# Patient Record
Sex: FEMALE | Race: WHITE | Employment: FULL TIME | URBAN - METROPOLITAN AREA
[De-identification: names, ages, dates, MRNs, and addresses within clinical notes are randomized per-mention and may not be internally consistent; named-entity substitution may affect disease eponyms.]

---

## 2017-08-17 ENCOUNTER — HOSPITAL ENCOUNTER (OUTPATIENT)
Dept: VASCULAR SURGERY | Age: 62
Discharge: HOME | End: 2017-08-17
Attending: INTERNAL MEDICINE
Payer: COMMERCIAL

## 2017-08-17 ENCOUNTER — HOSPITAL ENCOUNTER (OUTPATIENT)
Dept: ULTRASOUND IMAGING | Age: 62
Discharge: HOME | End: 2017-08-17
Attending: INTERNAL MEDICINE
Payer: COMMERCIAL

## 2017-08-17 DIAGNOSIS — R60.9 EDEMA: ICD-10-CM

## 2017-08-17 DIAGNOSIS — M79.604 RIGHT LEG PAIN: ICD-10-CM

## 2017-08-17 PROCEDURE — 93971 EXTREMITY STUDY: CPT

## 2017-08-17 NOTE — PROCEDURES
[facility]  *** AMENDED REPORT ***    Name: Kenyetta Charles  MRN: [id number]    Outpatient  : 1955  HIS Order #: [id number]  [id number] [facility] Visit #: [id number]  Date: 17 Aug 2017    TYPE OF TEST: Peripheral Venous Testing    REASON FOR TEST  Pain in limb, Limb swelling    Right Leg:-  Deep venous thrombosis:           No  Superficial venous thrombosis:    Yes  Deep venous insufficiency:        Not examined  Superficial venous insufficiency: Not examined      AMENDED INTERPRETATION/FINDINGS  Duplex images were obtained using 2-D gray scale, color flow, and  spectral Doppler analysis. Right leg :  1. Deep vein(s) visualized include the common femoral, deep femoral,  proximal femoral, mid femoral, popliteal(above knee),  popliteal(fossa), popliteal(below knee) and posterior tibial veins at  distal calf. 2. No evidence of deep venous thrombosis detected in the veins  visualized. 3. No evidence of deep vein thrombosis in the contralateral common  femoral vein. 4. Superficial vein(s) visualized include the great saphenous vein. 5. Superficial venous thrombosis identified in the great saphenous in  the proximal thigh, great saphenous in the mid thigh, great saphenous  in the distal thigh and great saphenous in the calf. AMENDED COMMENTS  Verbal results given to Dr. Selam Esquivel    I have personally reviewed the data relevant to the interpretation of  this study.     TECHNOLOGIST: Latanya Burch  Signed: 2017 04:41 PM    PHYSICIAN: Eliecer Fox MD  Signed: 2017 01:47 PM

## 2017-08-18 ENCOUNTER — APPOINTMENT (OUTPATIENT)
Dept: CT IMAGING | Age: 62
DRG: 176 | End: 2017-08-18
Attending: PHYSICIAN ASSISTANT
Payer: COMMERCIAL

## 2017-08-18 ENCOUNTER — APPOINTMENT (OUTPATIENT)
Dept: GENERAL RADIOLOGY | Age: 62
DRG: 176 | End: 2017-08-18
Attending: INTERNAL MEDICINE
Payer: COMMERCIAL

## 2017-08-18 ENCOUNTER — HOSPITAL ENCOUNTER (INPATIENT)
Age: 62
LOS: 4 days | Discharge: HOME | DRG: 176 | End: 2017-08-23
Attending: INTERNAL MEDICINE | Admitting: INTERNAL MEDICINE
Payer: COMMERCIAL

## 2017-08-18 DIAGNOSIS — I26.92 ACUTE SADDLE PULMONARY EMBOLISM WITHOUT ACUTE COR PULMONALE (HCC): Primary | ICD-10-CM

## 2017-08-18 DIAGNOSIS — I82.811 SUPERFICIAL THROMBOSIS OF LEG, RIGHT: ICD-10-CM

## 2017-08-18 LAB
ALBUMIN SERPL-MCNC: 3.9 G/DL (ref 3.4–5)
ALBUMIN/GLOB SERPL: 0.9 {RATIO} (ref 0.8–1.7)
ALP SERPL-CCNC: 126 U/L (ref 45–117)
ALT SERPL-CCNC: 52 U/L (ref 13–56)
ANION GAP SERPL CALC-SCNC: 10 MMOL/L (ref 3–18)
APPEARANCE UR: CLEAR
APTT PPP: 26.4 SEC (ref 23–36.4)
AST SERPL-CCNC: 48 U/L (ref 15–37)
BASOPHILS # BLD: 0 K/UL (ref 0–0.06)
BASOPHILS NFR BLD: 0 % (ref 0–2)
BILIRUB SERPL-MCNC: 0.9 MG/DL (ref 0.2–1)
BILIRUB UR QL: NEGATIVE
BNP SERPL-MCNC: 295 PG/ML (ref 0–900)
BUN SERPL-MCNC: 25 MG/DL (ref 7–18)
BUN/CREAT SERPL: 17 (ref 12–20)
CALCIUM SERPL-MCNC: 9.4 MG/DL (ref 8.5–10.1)
CHLORIDE SERPL-SCNC: 105 MMOL/L (ref 100–108)
CK MB CFR SERPL CALC: NORMAL % (ref 0–4)
CK MB SERPL-MCNC: <1 NG/ML (ref 5–25)
CK SERPL-CCNC: 32 U/L (ref 26–192)
CO2 SERPL-SCNC: 28 MMOL/L (ref 21–32)
COLOR UR: YELLOW
CREAT SERPL-MCNC: 1.48 MG/DL (ref 0.6–1.3)
DIFFERENTIAL METHOD BLD: NORMAL
EOSINOPHIL # BLD: 0.3 K/UL (ref 0–0.4)
EOSINOPHIL NFR BLD: 3 % (ref 0–5)
ERYTHROCYTE [DISTWIDTH] IN BLOOD BY AUTOMATED COUNT: 13.6 % (ref 11.6–14.5)
GLOBULIN SER CALC-MCNC: 4.3 G/DL (ref 2–4)
GLUCOSE SERPL-MCNC: 106 MG/DL (ref 74–99)
GLUCOSE UR STRIP.AUTO-MCNC: NEGATIVE MG/DL
HCT VFR BLD AUTO: 42.8 % (ref 35–45)
HGB BLD-MCNC: 14.2 G/DL (ref 12–16)
HGB UR QL STRIP: NEGATIVE
INR PPP: 1 (ref 0.8–1.2)
KETONES UR QL STRIP.AUTO: NEGATIVE MG/DL
LEUKOCYTE ESTERASE UR QL STRIP.AUTO: NEGATIVE
LYMPHOCYTES # BLD: 2.7 K/UL (ref 0.9–3.6)
LYMPHOCYTES NFR BLD: 24 % (ref 21–52)
MAGNESIUM SERPL-MCNC: 1.6 MG/DL (ref 1.6–2.6)
MCH RBC QN AUTO: 31 PG (ref 24–34)
MCHC RBC AUTO-ENTMCNC: 33.2 G/DL (ref 31–37)
MCV RBC AUTO: 93.4 FL (ref 74–97)
MONOCYTES # BLD: 0.7 K/UL (ref 0.05–1.2)
MONOCYTES NFR BLD: 6 % (ref 3–10)
NEUTS SEG # BLD: 7.5 K/UL (ref 1.8–8)
NEUTS SEG NFR BLD: 67 % (ref 40–73)
NITRITE UR QL STRIP.AUTO: NEGATIVE
PH UR STRIP: 6.5 [PH] (ref 5–8)
PLATELET # BLD AUTO: 193 K/UL (ref 135–420)
PMV BLD AUTO: 10.8 FL (ref 9.2–11.8)
POTASSIUM SERPL-SCNC: 4.6 MMOL/L (ref 3.5–5.5)
PROT SERPL-MCNC: 8.2 G/DL (ref 6.4–8.2)
PROT UR STRIP-MCNC: NEGATIVE MG/DL
PROTHROMBIN TIME: 13 SEC (ref 11.5–15.2)
RBC # BLD AUTO: 4.58 M/UL (ref 4.2–5.3)
SODIUM SERPL-SCNC: 143 MMOL/L (ref 136–145)
SP GR UR REFRACTOMETRY: 1.01 (ref 1–1.03)
TROPONIN I SERPL-MCNC: <0.02 NG/ML (ref 0–0.06)
UROBILINOGEN UR QL STRIP.AUTO: 0.2 EU/DL (ref 0.2–1)
WBC # BLD AUTO: 11.1 K/UL (ref 4.6–13.2)

## 2017-08-18 PROCEDURE — 99285 EMERGENCY DEPT VISIT HI MDM: CPT

## 2017-08-18 PROCEDURE — 81003 URINALYSIS AUTO W/O SCOPE: CPT | Performed by: PHYSICIAN ASSISTANT

## 2017-08-18 PROCEDURE — 83880 ASSAY OF NATRIURETIC PEPTIDE: CPT | Performed by: PHYSICIAN ASSISTANT

## 2017-08-18 PROCEDURE — 80053 COMPREHEN METABOLIC PANEL: CPT | Performed by: INTERNAL MEDICINE

## 2017-08-18 PROCEDURE — 82550 ASSAY OF CK (CPK): CPT | Performed by: INTERNAL MEDICINE

## 2017-08-18 PROCEDURE — 85730 THROMBOPLASTIN TIME PARTIAL: CPT | Performed by: INTERNAL MEDICINE

## 2017-08-18 PROCEDURE — 74011250636 HC RX REV CODE- 250/636: Performed by: PHYSICIAN ASSISTANT

## 2017-08-18 PROCEDURE — 85610 PROTHROMBIN TIME: CPT | Performed by: INTERNAL MEDICINE

## 2017-08-18 PROCEDURE — 77030013140 HC MSK NEB VYRM -A

## 2017-08-18 PROCEDURE — 74011250636 HC RX REV CODE- 250/636

## 2017-08-18 PROCEDURE — 74011000250 HC RX REV CODE- 250: Performed by: PHYSICIAN ASSISTANT

## 2017-08-18 PROCEDURE — 71020 XR CHEST PA LAT: CPT

## 2017-08-18 PROCEDURE — 71275 CT ANGIOGRAPHY CHEST: CPT

## 2017-08-18 PROCEDURE — 96375 TX/PRO/DX INJ NEW DRUG ADDON: CPT

## 2017-08-18 PROCEDURE — 85025 COMPLETE CBC W/AUTO DIFF WBC: CPT | Performed by: INTERNAL MEDICINE

## 2017-08-18 PROCEDURE — 83735 ASSAY OF MAGNESIUM: CPT | Performed by: PHYSICIAN ASSISTANT

## 2017-08-18 PROCEDURE — 96361 HYDRATE IV INFUSION ADD-ON: CPT

## 2017-08-18 PROCEDURE — 96374 THER/PROPH/DIAG INJ IV PUSH: CPT

## 2017-08-18 PROCEDURE — 93005 ELECTROCARDIOGRAM TRACING: CPT

## 2017-08-18 PROCEDURE — 74011636320 HC RX REV CODE- 636/320: Performed by: INTERNAL MEDICINE

## 2017-08-18 RX ORDER — IPRATROPIUM BROMIDE AND ALBUTEROL SULFATE 2.5; .5 MG/3ML; MG/3ML
3 SOLUTION RESPIRATORY (INHALATION)
Status: COMPLETED | OUTPATIENT
Start: 2017-08-18 | End: 2017-08-18

## 2017-08-18 RX ORDER — ONDANSETRON 2 MG/ML
4 INJECTION INTRAMUSCULAR; INTRAVENOUS
Status: COMPLETED | OUTPATIENT
Start: 2017-08-18 | End: 2017-08-18

## 2017-08-18 RX ORDER — FUROSEMIDE 10 MG/ML
40 INJECTION INTRAMUSCULAR; INTRAVENOUS
Status: COMPLETED | OUTPATIENT
Start: 2017-08-18 | End: 2017-08-18

## 2017-08-18 RX ORDER — SULFAMETHOXAZOLE AND TRIMETHOPRIM 800; 160 MG/1; MG/1
1 TABLET ORAL 2 TIMES DAILY
COMMUNITY
End: 2017-08-23

## 2017-08-18 RX ORDER — FUROSEMIDE 10 MG/ML
80 INJECTION INTRAMUSCULAR; INTRAVENOUS
Status: DISCONTINUED | OUTPATIENT
Start: 2017-08-18 | End: 2017-08-18 | Stop reason: SDUPTHER

## 2017-08-18 RX ORDER — ONDANSETRON 2 MG/ML
INJECTION INTRAMUSCULAR; INTRAVENOUS
Status: COMPLETED
Start: 2017-08-18 | End: 2017-08-18

## 2017-08-18 RX ADMIN — IOPAMIDOL 75 ML: 755 INJECTION, SOLUTION INTRAVENOUS at 23:32

## 2017-08-18 RX ADMIN — FUROSEMIDE 40 MG: 10 INJECTION, SOLUTION INTRAMUSCULAR; INTRAVENOUS at 19:45

## 2017-08-18 RX ADMIN — ONDANSETRON 4 MG: 2 INJECTION INTRAMUSCULAR; INTRAVENOUS at 21:25

## 2017-08-18 RX ADMIN — SODIUM CHLORIDE 1000 ML: 900 INJECTION, SOLUTION INTRAVENOUS at 21:25

## 2017-08-18 RX ADMIN — IPRATROPIUM BROMIDE AND ALBUTEROL SULFATE 3 ML: .5; 3 SOLUTION RESPIRATORY (INHALATION) at 20:02

## 2017-08-18 NOTE — ED NOTES
Report received from [address] assumed care of pt. Pt resting comfortably in stretcher with family at bedside. Updated pt on plan of care awaiting test results. Pt verbalized understanding, warm blankets provided to pt for comfort, bed in lowest lock position, side rails up x2, call bell in reach of pt and pt instructed to use call bell for assistance.

## 2017-08-18 NOTE — Clinical Note
Status[de-identified] Inpatient [101] Type of Bed: Telemetry [19] Inpatient Hospitalization Certified Necessary for the Following Reasons: 3. Patient receiving treatment that can only be provided in an inpatient setting (further clarification in H&P documentation) Admitting Diagnosis: Pulmonary emboli ([address] [891392] Admitting Physician: Melissa Mcconnell Attending Physician: Melissa Mcconnell Estimated Length of Stay: 2 Midnights Discharge Plan[de-identified] Home with Office Follow-up

## 2017-08-18 NOTE — IP AVS SNAPSHOT
Milton Doss 
 
 
 [address] [place] [id number] 
[phone] Patient: Halie Hwang 
MRN: [id number]:[id number] You are allergic to the following No active allergies Recent Documentation Height Weight BMI OB Status Smoking Status 1.676 m 136.1 kg 48.43 kg/m2 Hysterectomy Never Smoker Unresulted Labs Order Current Status LUPUS ANTICOAGULANT PANEL W/ REFLEX In process Emergency Contacts Name Discharge Info Relation Home Work Mobile [address] CAREGIVER [3] Spouse [3] [phone] About your hospitalization You were admitted on:  August 19, 2017 You last received care in the:  [address] [place] [address] You were discharged on:  August 23, 2017 Unit phone number:  [phone] Why you were hospitalized Your primary diagnosis was:  Acute [place] Pulmonary Embolism Without Acute Cor Pulmonale (Hcc) Your diagnoses also included:  Pulmonary Emboli (Hcc), Essential Hypertension, Benign, Acquired Hypothyroidism, Superficial Thrombophlebitis Providers Seen During Your Hospitalizations Provider Role Specialty Primary office phone Tenisha Jordan MD Attending Provider Emergency Medicine [phone] Jenniffer Jimenez DO Attending Provider Internal Medicine [phone] Your Primary Care Physician (PCP) Primary Care Physician Office Phone Office Fax Marilynfredy Carreon [phone] Follow-up Information Follow up With Details Comments Contact Info Dr[address]  In 1 month Dr. Jason bashir  In 2 weeks Malu Medina MD   [address] [place] [address] [address] [place] [address] 
[phone] Current Discharge Medication List  
  
START taking these medications Dose & Instructions Dispensing Information Comments Morning Noon Evening Bedtime rivaroxaban 15 mg (42)- 20 mg (9) Dspk Commonly known as:  Kayy Contreras Your last dose was: Your next dose is: Take one 15 mg tablet twice a day with food for the first 21 days. Then, take one 20 mg tablet once a day with food for 9 days. Quantity:  1 Dose Pack Refills:  0 CONTINUE these medications which have NOT CHANGED Dose & Instructions Dispensing Information Comments Morning Noon Evening Bedtime  
 furosemide 20 mg tablet Commonly known as:  LASIX Your last dose was: Your next dose is:    
   
   
 Dose:  20 mg Take 20 mg by mouth daily. Refills:  0  
     
   
   
   
  
 levothyroxine 200 mcg tablet Commonly known as:  SYNTHROID Your last dose was: Your next dose is:    
   
   
 Dose:  200 mcg Take 200 mcg by mouth Daily (before breakfast). Refills:  0  
     
   
   
   
  
 lisinopril 40 mg tablet Commonly known as:  Velton Hampton Your last dose was: Your next dose is:    
   
   
 Dose:  40 mg Take 40 mg by mouth daily. Refills:  0 STOP taking these medications BACTRIM -800 mg per tablet Generic drug:  trimethoprim-sulfamethoxazole  
   
  
 ibuprofen 200 mg tablet Commonly known as:  MOTRIN Where to Get Your Medications Information on where to get these meds will be given to you by the nurse or doctor. ! Ask your nurse or doctor about these medications  
  rivaroxaban 15 mg (42)- 20 mg (9) Dspk Discharge Instructions DISCHARGE SUMMARY from Nurse The following personal items are in your possession at time of discharge: 
 
Dental Appliances: Uppers Visual Aid: Glasses, With patient Home Medications: None Jewelry: Ring Clothing: Sweater Other Valuables: Cell Phone PATIENT INSTRUCTIONS: 
 
 
F-face looks uneven A-arms unable to move or move unevenly S-speech slurred or non-existent T-time-call 911 as soon as signs and symptoms begin-DO NOT go Back to bed or wait to see if you get better-TIME IS BRAIN. Warning Signs of HEART ATTACK Call 911 if you have these symptoms: 
? Chest discomfort. Most heart attacks involve discomfort in the center of the chest that lasts more than a few minutes, or that goes away and comes back. It can feel like uncomfortable pressure, squeezing, fullness, or pain. ? Discomfort in other areas of the upper body. Symptoms can include pain or discomfort in one or both arms, the back, neck, jaw, or stomach. ? Shortness of breath with or without chest discomfort. ? Other signs may include breaking out in a cold sweat, nausea, or lightheadedness. Don't wait more than five minutes to call [address]! Fast action can save your life. Calling 911 is almost always the fastest way to get lifesaving treatment. Emergency Medical Services staff can begin treatment when they arrive  up to an hour sooner than if someone gets to the hospital by car. The discharge information has been reviewed with the patient and spouse. The patient and spouse verbalized understanding. Discharge medications reviewed with the patient and spouse and appropriate educational materials and side effects teaching were provided. Patient armband removed and shredded Discharge Instructions Attachments/References HYPERTENSION (ENGLISH) Discharge Orders None [facility] Announcement We are excited to announce that we are making your provider's discharge notes available to you in Springestt. You will see these notes when they are completed and signed by the physician that discharged you from your recent hospital stay.   If you have any questions or concerns about any information you see in Banter!, please call the Health Information Department where you were seen or reach out to your Primary Care Provider for more information about your plan of care. Introducing [facility]! Lyudmila Lou introduces Banter! patient portal. Now you can access parts of your medical record, email your doctor's office, and request medication refills online. 1. In your internet browser, go to [email or web address] 2. Click on the First Time User? Click Here link in the Sign In box. You will see the New Member Sign Up page. 3. Enter your Banter! Access Code exactly as it appears below. You will not need to use this code after youve completed the sign-up process. If you do not sign up before the expiration date, you must request a new code. · Banter! Access Code: [email or web address] Expires: 11/15/2017  2:09 PM 
 
4. Enter the last four digits of your Social Security Number (xxxx) and Date of Birth (mm/dd/yyyy) as indicated and click Submit. You will be taken to the next sign-up page. 5. Create a Banter! ID. This will be your Banter! login ID and cannot be changed, so think of one that is secure and easy to remember. 6. Create a Banter! password. You can change your password at any time. 7. Enter your Password Reset Question and Answer. This can be used at a later time if you forget your password. 8. Enter your e-mail address. You will receive e-mail notification when new information is available in [address] 9. Click Sign Up. You can now view and download portions of your medical record. 10. Click the Download Summary menu link to download a portable copy of your medical information. If you have questions, please visit the Frequently Asked Questions section of the Banter! website. Remember, Banter! is NOT to be used for urgent needs. For medical emergencies, dial 911. Now available from your iPhone and Android! General Information Please provide this summary of care documentation to your next provider. Patient Signature:  ____________________________________________________________ Date:  ____________________________________________________________  
  
[facility] Provider Signature:  ____________________________________________________________ Date:  ____________________________________________________________ More Information High Blood Pressure: Care Instructions Your Care Instructions If your blood pressure is usually above 140/90, you have high blood pressure, or hypertension. That means the top number is 140 or higher or the bottom number is 90 or higher, or both. Despite what a lot of people think, high blood pressure usually doesn't cause headaches or make you feel dizzy or lightheaded. It usually has no symptoms. But it does increase your risk for heart attack, stroke, and kidney or eye damage. The higher your blood pressure, the more your risk increases. Your doctor will give you a goal for your blood pressure. Your goal will be based on your health and your age. An example of a goal is to keep your blood pressure below 140/90. Lifestyle changes, such as eating healthy and being active, are always important to help lower blood pressure. You might also take medicine to reach your blood pressure goal. 
Follow-up care is a key part of your treatment and safety. Be sure to make and go to all appointments, and call your doctor if you are having problems. It's also a good idea to know your test results and keep a list of the medicines you take. How can you care for yourself at home? Medical treatment · If you stop taking your medicine, your blood pressure will go back up. You may take one or more types of medicine to lower your blood pressure. Be safe with medicines. Take your medicine exactly as prescribed. Call your doctor if you think you are having a problem with your medicine. · Talk to your doctor before you start taking aspirin every day. Aspirin can help certain people lower their risk of a heart attack or stroke. But taking aspirin isn't right for everyone, because it can cause serious bleeding. · See your doctor regularly. You may need to see the doctor more often at first or until your blood pressure comes down. · If you are taking blood pressure medicine, talk to your doctor before you take decongestants or anti-inflammatory medicine, such as ibuprofen. Some of these medicines can raise blood pressure. · Learn how to check your blood pressure at home. Lifestyle changes · Stay at a healthy weight. This is especially important if you put on weight around the waist. Losing even 10 pounds can help you lower your blood pressure. · If your doctor recommends it, get more exercise. Walking is a good choice. Bit by bit, increase the amount you walk every day. Try for at least 30 minutes on most days of the week. You also may want to swim, bike, or do other activities. · Avoid or limit alcohol. Talk to your doctor about whether you can drink any alcohol. · Try to limit how much sodium you eat to less than 2,300 milligrams (mg) a day. Your doctor may ask you to try to eat less than 1,500 mg a day. · Eat plenty of fruits (such as bananas and oranges), vegetables, legumes, whole grains, and low-fat dairy products. · Lower the amount of saturated fat in your diet. Saturated fat is found in animal products such as milk, cheese, and meat. Limiting these foods may help you lose weight and also lower your risk for heart disease. · Do not smoke. Smoking increases your risk for heart attack and stroke. If you need help quitting, talk to your doctor about stop-smoking programs and medicines. These can increase your chances of quitting for good. When should you call for help? Call 911 anytime you think you may need emergency care.  This may mean having symptoms that suggest that your blood pressure is causing a serious heart or blood vessel problem. Your blood pressure may be over 180/110. For example, call 911 if: 
· You have symptoms of a heart attack. These may include: ¨ Chest pain or pressure, or a strange feeling in the chest. 
¨ Sweating. ¨ Shortness of breath. ¨ Nausea or vomiting. ¨ Pain, pressure, or a strange feeling in the back, neck, jaw, or upper belly or in one or both shoulders or arms. ¨ Lightheadedness or sudden weakness. ¨ A fast or irregular heartbeat. · You have symptoms of a stroke. These may include: 
¨ Sudden numbness, tingling, weakness, or loss of movement in your face, arm, or leg, especially on only one side of your body. ¨ Sudden vision changes. ¨ Sudden trouble speaking. ¨ Sudden confusion or trouble understanding simple statements. ¨ Sudden problems with walking or balance. ¨ A sudden, severe headache that is different from past headaches. · You have severe back or belly pain. Do not wait until your blood pressure comes down on its own. Get help right away. Call your doctor now or seek immediate care if: 
· Your blood pressure is much higher than normal (such as 180/110 or higher), but you don't have symptoms. · You think high blood pressure is causing symptoms, such as: ¨ Severe headache. ¨ Blurry vision. Watch closely for changes in your health, and be sure to contact your doctor if: 
· Your blood pressure measures 140/90 or higher at least 2 times. That means the top number is 140 or higher or the bottom number is 90 or higher, or both. · You think you may be having side effects from your blood pressure medicine. · Your blood pressure is usually normal, but it goes above normal at least 2 times. Where can you learn more? Go to http://rj-raquel.info/. Enter C920 in the search box to learn more about \"High Blood Pressure: Care Instructions. \" Current as of: August 8, 2016 Content Version: 11.3 © 4291-3686 [employer]. Care instructions adapted under license by [employer] (which disclaims liability or warranty for this information). If you have questions about a medical condition or this instruction, always ask your healthcare professional. [address] any warranty or liability for your use of this information.

## 2017-08-18 NOTE — ED NOTES
Pt report given to Colette Gudino RN. SBAR, ED summary, MAR and recent results reviewed. Pt resting in stretcher with side rails raised and call light within reach. Pt in NAD at this time.

## 2017-08-18 NOTE — ED TRIAGE NOTES
Pt states \"this morning I started with having shortness of breath worse when I walk around. I break out into a sweat. \" States had a vascular study completed on RLE here yesterday to r/o blood clot as pt has been having redness, pain and swelling to RLE being tx with various abx by PCP. Unknown results. Pt states now the leg feels better. Denies CP, dizziness, nausea. Sepsis Screening completed    (  )Patient meets SIRS criteria. ( x )Patient does not meet SIRS criteria.       SIRS Criteria is achieved when two or more of the following are present   Temperature < 96.8°F (36°C) or > 100.9°F (38.3°C)   Heart Rate > 90 beats per minute   Respiratory Rate > 20 breaths per minute   WBC count > 12,000 or <4,000 or > 10% bands

## 2017-08-18 NOTE — ED PROVIDER NOTES
HPI Comments: 6:54 PM    Liya Falcon is a 58 y.o. female with PMHx of HTN, hypothyroidism, arthritis, and morbid obesity presenting to the ED c/o SOB, onset this AM. Pt states that sxs are worsened by ambulating. Associated symptoms include chest tightness and minimal non-productive cough. Pt also notes RLE redness, pain, and edema (unchanged from baseline). She was evaluated two weeks ago by Bay Bone MD (PCP) for similar sxs and was rx'd Cipro and prednisone with no relief. Pt saw his partner Cintia Lu DO (PCP) yesterday for right leg pain and edema and was rx'd Bactrim. Pain to R thigh has improved. Pt has outpatient US performed today which revealed superficial thrombosis but no DVT. No hx of DVT/PE. Pt note having a cardiac stress test about 15 years ago. Daily medications include Lasix 20 mg, Ibuprofen 200 mg, Levothyroxine 200 mcg, and Lisinopril 40 mg. Pt denies any history of PE, CHF, CAD, MI, COPD, or asthma. Pt specifically denies any CP, congestion, fever, and rhinorrhea. PCP: Scarlett Mcrae MD    There are no other complaints, changes, or physical findings at this time. US duplex venous 08/18/2017  Duplex images were obtained using 2-D gray scale, color flow, and  spectral Doppler analysis. Right leg :  1. Deep vein(s) visualized include the common femoral, deep femoral,  proximal femoral, mid femoral, popliteal(above knee),  popliteal(fossa), popliteal(below knee) and posterior tibial veins at  distal calf. 2. No evidence of deep venous thrombosis detected in the veins  visualized. 3. No evidence of deep vein thrombosis in the contralateral common  femoral vein. 4. Superficial vein(s) visualized include the great saphenous vein. 5. Superficial venous thrombosis identified in the great saphenous in  the proximal thigh, great saphenous in the mid thigh, great saphenous  in the distal thigh and great saphenous in the calf.       Patient is a 58 y.o. female presenting with shortness of breath. The history is provided by the patient. No  was used. Shortness of Breath   Associated symptoms include cough (dry) and leg swelling. Pertinent negatives include no fever, no chest pain and no vomiting. Past Medical History:   Diagnosis Date    Arthritis     Cancer ([facility] [address]     kidney right    Hypertension     Morbid obesity ([facility] [address]     Thyroid disease     hypothyroidism       Past Surgical History:   Procedure Laterality Date    HX GI      jeannie    HX HYSTERECTOMY      HX OOPHORECTOMY      HX ORTHOPAEDIC      left ankle fx repair    HX UROLOGICAL      right kidney removed         History reviewed. No pertinent family history. Social History     Social History    Marital status:      Spouse name: N/A    Number of children: N/A    Years of education: N/A     Occupational History    Not on file. Social History Main Topics    Smoking status: Never Smoker    Smokeless tobacco: Never Used    Alcohol use No    Drug use: No    Sexual activity: Not on file     Other Topics Concern    Not on file     Social History Narrative         ALLERGIES: Review of patient's allergies indicates no known allergies. Review of Systems   Constitutional: Negative for chills and fever. Respiratory: Positive for cough (dry), chest tightness and shortness of breath. Cardiovascular: Positive for leg swelling. Negative for chest pain. Gastrointestinal: Negative for nausea and vomiting. Musculoskeletal: Positive for myalgias (RLE leg pain). Skin: Positive for color change (RLE redness). Neurological: Negative for dizziness, weakness and numbness. Hematological: Does not bruise/bleed easily.        Vitals:    08/19/17 0700 08/19/17 0802 08/19/17 0824 08/19/17 0900   BP: 98/51 (!) 86/48  90/52   Pulse: 80 93  84   Resp: 17 23 17   Temp:  98.8 °F (37.1 °C) 98.8 °F (37.1 °C)    SpO2: 94% 96%  94%   Weight:       Height:                Physical Exam Constitutional: She is oriented to person, place, and time. She appears well-developed and well-nourished. No distress.  female in NAD. Alert. No resp distress. Speaking in complete sentences     HENT:   Head: Normocephalic and atraumatic. Right Ear: External ear normal.   Left Ear: External ear normal.   Mouth/Throat: Uvula is midline, oropharynx is clear and moist and mucous membranes are normal.   Eyes: Conjunctivae are normal.   Neck: Normal range of motion. Cardiovascular: Regular rhythm, normal heart sounds and intact distal pulses. Tachycardia present. Pulses:       Dorsalis pedis pulses are 2+ on the right side, and 2+ on the left side. Posterior tibial pulses are 2+ on the right side, and 2+ on the left side. Tachy at 120   Pulmonary/Chest: Effort normal and breath sounds normal. No respiratory distress. She has no wheezes. She has no rales. Musculoskeletal: Normal range of motion. Right upper leg: She exhibits swelling. She exhibits no tenderness. Right lower leg: She exhibits tenderness and swelling. Legs:       Right foot: There is normal range of motion, no tenderness, no bony tenderness and no swelling. Neurological: She is alert and oriented to person, place, and time. Skin: She is not diaphoretic. Psychiatric: She has a normal mood and affect. Judgment normal.   Nursing note and vitals reviewed.        RESULTS:    CARDIAC MONITOR NOTE:  Cardiac Rhythm: sinus tachycardia  Rate: 101 bpm     PULSE OXIMETRY NOTE:  Pulse-ox is 100% on RA  Interpretation: normal       EKG interpretation: (Preliminary)  6:14 PM   113 bpm, sinus tachycardia, possible left atrial enlargement, left ventricular hypertrophy, no STEMI  EKG read by Tawanna Gallo MD at 6:24 PM    CTA CHEST W OR W WO CONT   Final Result   Bilateral pulmonary embolism with a thin saddle component.     Splenomegaly.     7 mm right upper lobe pulmonary nodule which appears to contain fat and possibly  an adenoma. Consider a 6 month follow-up.     Findings called to emergency department prior to signing report. As read by the radiologist.      XR CHEST PA LAT    (Results Pending)      7:38 PM  RADIOLOGY FINDINGS  Chest X-ray shows no acute process  Pending review by Radiologist  Recorded by Luke Mayo, ED Scribe, as dictated by Hood Rubio PA-C      Labs Reviewed   METABOLIC PANEL, COMPREHENSIVE - Abnormal; Notable for the following:        Result Value    Glucose 106 (*)     BUN 25 (*)     Creatinine 1.48 (*)     GFR est AA 43 (*)     GFR est non-AA 36 (*)     AST (SGOT) 48 (*)     Alk. phosphatase 126 (*)     Globulin 4.3 (*)     All other components within normal limits   PTT - Abnormal; Notable for the following:     aPTT 113.1 (*)     All other components within normal limits   CBC WITH AUTOMATED DIFF - Abnormal; Notable for the following:     RBC 3.60 (*)     HGB 11.1 (*)     HCT 33.8 (*)     All other components within normal limits   MAGNESIUM - Abnormal; Notable for the following:     Magnesium 1.4 (*)     All other components within normal limits   CBC WITH AUTOMATED DIFF   CARDIAC PANEL,(CK, CKMB & TROPONIN)   NT-PRO BNP   MAGNESIUM   PROTHROMBIN TIME + INR   PTT   URINALYSIS W/ RFLX MICROSCOPIC   PHOSPHORUS   PTT   PTT       Recent Results (from the past 12 hour(s))   PTT    Collection Time: 08/19/17  7:05 AM   Result Value Ref Range    aPTT 113.1 (H) 23.0 - 36.4 SEC   CBC WITH AUTOMATED DIFF    Collection Time: 08/19/17  7:05 AM   Result Value Ref Range    WBC 9.5 4.6 - 13.2 K/uL    RBC 3.60 (L) 4.20 - 5.30 M/uL    HGB 11.1 (L) 12.0 - 16.0 g/dL    HCT 33.8 (L) 35.0 - 45.0 %    MCV 93.9 74.0 - 97.0 FL    MCH 30.8 24.0 - 34.0 PG    MCHC 32.8 31.0 - 37.0 g/dL    RDW 13.9 11.6 - 14.5 %    PLATELET 358 452 - 548 K/uL    MPV 10.1 9.2 - 11.8 FL    NEUTROPHILS 59 40 - 73 %    LYMPHOCYTES 30 21 - 52 %    MONOCYTES 8 3 - 10 %    EOSINOPHILS 3 0 - 5 %    BASOPHILS 0 0 - 2 %    ABS.  NEUTROPHILS 5.6 1.8 - 8.0 K/UL    ABS. LYMPHOCYTES 2.9 0.9 - 3.6 K/UL    ABS. MONOCYTES 0.8 0.05 - 1.2 K/UL    ABS. EOSINOPHILS 0.3 0.0 - 0.4 K/UL    ABS. BASOPHILS 0.0 0.0 - 0.06 K/UL    DF AUTOMATED     MAGNESIUM    Collection Time: 08/19/17  7:05 AM   Result Value Ref Range    Magnesium 1.4 (L) 1.6 - 2.6 mg/dL   PHOSPHORUS    Collection Time: 08/19/17  7:05 AM   Result Value Ref Range    Phosphorus 3.7 2.5 - 4.9 MG/DL       MDM  Number of Diagnoses or Management Options  Diagnosis management comments: ACS/MI, PE, arrhythmia, COPD, CHF, asthma, PNA, PTX.  Doubt dissection       Amount and/or Complexity of Data Reviewed  Clinical lab tests: ordered and reviewed (Prothrombin, PTT, UA, CMC, CMP, cardiac panel, NT-pro BNP, magnesium)  Tests in the radiology section of CPT®: ordered and reviewed (CXR)  Tests in the medicine section of CPT®: ordered and reviewed (EKG)  Discuss the patient with other providers: yes Asia Sanchez DO (Internal Medicine))  Independent visualization of images, tracings, or specimens: yes (CXR)    Critical Care  Total time providing critical care: 30-74 minutes (60 minutes)    ED Course     MEDICATIONS GIVEN:  Medications   heparin 25,000 units in D5W 250 ml infusion (14 Units/kg/hr × 136.1 kg IntraVENous Rate Change 8/19/17 0757)   acetaminophen (TYLENOL) tablet 650 mg (not administered)   morphine injection 2 mg (2 mg IntraVENous Given 8/19/17 0350)   0.9% sodium chloride infusion (100 mL/hr IntraVENous Continued On Admission 8/19/17 0300)   albuterol-ipratropium (DUO-NEB) 2.5 MG-0.5 MG/3 ML (3 mL Nebulization Given 8/18/17 2002)   furosemide (LASIX) injection 40 mg (40 mg IntraVENous Given 8/18/17 1945)   sodium chloride 0.9 % bolus infusion 1,000 mL (0 mL IntraVENous IV Completed 8/19/17 0220)   ondansetron (ZOFRAN) injection 4 mg (4 mg IntraVENous Given 8/18/17 2125)   iopamidol (ISOVUE-370) 76 % injection 75 mL (75 mL IntraVENous Given 8/18/17 9530)   heparin (porcine) 1,000 unit/mL injection 10,000 Units (10,000 Units IntraVENous Given 8/19/17 0101)       Procedures      PROGRESS NOTE:  6:54 PM  Initial assessment performed. Written by KAITLYN Wagner, as dictated by Raquel Franklin PA-C    BEDSIDE TRANSFER OF CARE:  8:00 PM  Patient care will be transferred from Raquel Franklin PA-C to Miko Martinez PA-C. Discussed available diagnostic results and care plan at length at beside. Patient and family made aware of provider change. All questions answered. Patient and family voiced understanding. To follow up on CTA. Written by Cosette Lombard, ED Scribe, as dictated by Raquel Franklin PA-C.    CONSULT NOTE:   9:14 PM  Miko Martinez PA-C spoke with April Arellano DO   Specialty: Internal Medicine   Discussed pt's hx, disposition, and available diagnostic and imaging results over the telephone. Reviewed care plans. Consulting physician agrees with plans as outlined. Agrees with giving 500 mL IV fluids before and after CTA Chest.   Written by Cosette Lombard, ED Scribe, as dictated by Miko Martinez PA-C.     CONSULT NOTE:   12:39 AM  Miko Martinez PA-C spoke with April Arellano DO   Specialty: Internal Medicine  Discussed pt's hx, disposition, and available diagnostic and imaging results over the telephone. Reviewed care plans. Consulting physician agrees with plans as outlined. Agrees to admit to Telemetry. Recommends giving Heparin. Written by Cosette Lombard, ED Scribe, as dictated by Miko Martinez PA-C.    ADMISSION NOTE:  12:39 AM  Patient is being admitted to the hospital by April Arellano DO. The results of their tests and reasons for their admission have been discussed with them and/or available family. They convey agreement and understanding for the need to be admitted and for their admission diagnosis. Written by Cosette Lombard, ED Scribe, as dictated by Miko Martinez PA-C.    CONDITIONS ON ADMISSION:  Deep Vein Thrombosis is not present at the time of admission.  Thrombosis is present at the time of admission. Pneumonia is not present at the time of admission. MRSA is not present at the time of admission. Wound infection is not present at the time of admission. Pressure Ulcer is not present at the time of admission. CLINICAL IMPRESSION    1. Acute saddle pulmonary embolism without acute cor pulmonale (HCC)    2. Superficial thrombosis of leg, right        PLAN: ADMIT TO TELEMETRY     12:46 AM  I have spent 60 minutes of critical care time involved in lab review, consultations with specialist, family decision-making, and documentation. During this entire length of time I was immediately available to the patient. Critical Care: The reason for providing this level of medical care for this critically ill patient was due a critical illness that impaired one or more vital organ systems such that there was a high probability of imminent or life threatening deterioration in the patients condition. This care involved high complexity decision making to assess, manipulate, and support vital system functions, to treat this degreee vital organ system failure and to prevent further life threatening deterioration of the patients condition. SCRIBE ATTESTATION:  This note is prepared by Adore Boswell, acting as Scribe for Tosin Castellano PA-C    This note is prepared by [employer], acting as Scribe for Lorane Gottron, PA-C    PROVIDER ATTESTATION:  Tosin Castellano PA-C: The scribe's documentation has been prepared under my direction and personally reviewed by me in its entirety. I confirm that the note above accurately reflects all work, treatment, procedures, and medical decision making performed by me. Lorane Gottron, PA-C: The scribe's documentation has been prepared under my direction and personally reviewed by me in its entirety. I confirm that the note above accurately reflects all work, treatment, procedures, and medical decision making performed by me.

## 2017-08-18 NOTE — IP AVS SNAPSHOT
Summary of Care Report The Summary of Care report has been created to help improve care coordination. Users with access to [employer] or [address] (Web-based application) may access additional patient information including the Discharge Summary. If you are not currently a [address] user and need more information, please call the number listed below in the [place] [address] section and ask to be connected with Medical Records. Facility Information Name Address Phone [address] [address] [phone] Patient Information Patient Name Sex Essie Johnson ([id number]) Female 1955 Discharge Information Admitting Provider Service Area Unit Celso Reyes DO / [address] [place] Card/Med / [phone] Discharge Provider Discharge Date/Time Discharge Disposition Destination (none) 8/23/2017 (Pending) AHR (none) Patient Language Language ENGLISH [13] Hospital Problems as of 8/23/2017  Reviewed: 8/19/2017 12:57 AM by Celso Reyes,  Class Noted - Resolved Last Modified POA Active Problems * (Principal)Acute saddle pulmonary embolism without acute cor pulmonale ([facility] [address]  8/19/2017 - Present 8/19/2017 by Ori Valles, DO Yes Entered by Friendstermalachi Reyes, DO Essential hypertension, benign  8/19/2017 - Present 8/19/2017 by Ori Valles, DO Yes Entered by Friendsterasifss Rack, DO Acquired hypothyroidism  8/19/2017 - Present 8/19/2017 by Ori Part, DO Yes Entered by Friendstermalachi Reyes, DO Superficial thrombophlebitis  8/19/2017 - Present 8/19/2017 by Ori Part, DO Yes Entered by Celso Reyes, DO Non-Hospital Problems as of 8/23/2017  Reviewed: 8/19/2017 12:57 AM by Celso Reyes, DO None You are allergic to the following No active allergies Current Discharge Medication List  
  
START taking these medications Dose & Instructions Dispensing Information Comments  
 rivaroxaban 15 mg (42)- 20 mg (9) Dspk Commonly known as:  Electa Dux Take one 15 mg tablet twice a day with food for the first 21 days. Then, take one 20 mg tablet once a day with food for 9 days. Quantity:  1 Dose Pack Refills:  0 CONTINUE these medications which have NOT CHANGED Dose & Instructions Dispensing Information Comments  
 furosemide 20 mg tablet Commonly known as:  LASIX Dose:  20 mg Take 20 mg by mouth daily. Refills:  0  
   
 levothyroxine 200 mcg tablet Commonly known as:  SYNTHROID Dose:  200 mcg Take 200 mcg by mouth Daily (before breakfast). Refills:  0  
   
 lisinopril 40 mg tablet Commonly known as:  Bur[place] Fairy Dose:  40 mg Take 40 mg by mouth daily. Refills:  0 STOP taking these medications Comments BACTRIM -800 mg per tablet Generic drug:  trimethoprim-sulfamethoxazole  
   
   
 ibuprofen 200 mg tablet Commonly known as:  MOTRIN Follow-up Information Follow up With Details Comments Contact Info Dr. Kanwal Thorpe  In 1 month Dr. Meredith bashir  In 2 weeks Katelynn Salcido MD   [address] [address] 
[phone] Discharge Instructions DISCHARGE SUMMARY from Nurse The following personal items are in your possession at time of discharge: 
 
Dental Appliances: Uppers Visual Aid: Glasses, With patient Home Medications: None Jewelry: Ring Clothing: Sweater Other Valuables: Cell Phone PATIENT INSTRUCTIONS: 
 
 
F-face looks uneven A-arms unable to move or move unevenly S-speech slurred or non-existent T-time-call 911 as soon as signs and symptoms begin-DO NOT go Back to bed or wait to see if you get better-TIME IS BRAIN. Warning Signs of HEART ATTACK Call 911 if you have these symptoms: 
? Chest discomfort. Most heart attacks involve discomfort in the center of the chest that lasts more than a few minutes, or that goes away and comes back. It can feel like uncomfortable pressure, squeezing, fullness, or pain. ? Discomfort in other areas of the upper body. Symptoms can include pain or discomfort in one or both arms, the back, neck, jaw, or stomach. ? Shortness of breath with or without chest discomfort. ? Other signs may include breaking out in a cold sweat, nausea, or lightheadedness. Don't wait more than five minutes to call [address]! Fast action can save your life. Calling 911 is almost always the fastest way to get lifesaving treatment. Emergency Medical Services staff can begin treatment when they arrive  up to an hour sooner than if someone gets to the hospital by car. The discharge information has been reviewed with the patient and spouse. The patient and spouse verbalized understanding. Discharge medications reviewed with the patient and spouse and appropriate educational materials and side effects teaching were provided. Patient armband removed and shredded Chart Review Routing History No Routing History on File

## 2017-08-19 PROBLEM — I26.99 PULMONARY EMBOLI (HCC): Status: RESOLVED | Noted: 2017-08-19 | Resolved: 2017-08-19

## 2017-08-19 PROBLEM — I26.99 PULMONARY EMBOLISM (HCC): Status: ACTIVE | Noted: 2017-08-19

## 2017-08-19 PROBLEM — I80.9 SUPERFICIAL THROMBOPHLEBITIS: Status: ACTIVE | Noted: 2017-08-19

## 2017-08-19 PROBLEM — E03.9 ACQUIRED HYPOTHYROIDISM: Status: ACTIVE | Noted: 2017-08-19

## 2017-08-19 PROBLEM — I10 ESSENTIAL HYPERTENSION, BENIGN: Status: ACTIVE | Noted: 2017-08-19

## 2017-08-19 PROBLEM — I26.99 PULMONARY EMBOLI (HCC): Status: ACTIVE | Noted: 2017-08-19

## 2017-08-19 PROBLEM — I26.92 ACUTE SADDLE PULMONARY EMBOLISM WITHOUT ACUTE COR PULMONALE (HCC): Status: ACTIVE | Noted: 2017-08-19

## 2017-08-19 LAB
APTT PPP: 113.1 SEC (ref 23–36.4)
APTT PPP: 126.4 SEC (ref 23–36.4)
APTT PPP: 67.4 SEC (ref 23–36.4)
ATRIAL RATE: 113 BPM
BASOPHILS # BLD: 0 K/UL (ref 0–0.06)
BASOPHILS NFR BLD: 0 % (ref 0–2)
CALCULATED P AXIS, ECG09: 42 DEGREES
CALCULATED R AXIS, ECG10: -22 DEGREES
CALCULATED T AXIS, ECG11: 70 DEGREES
DIAGNOSIS, 93000: NORMAL
DIFFERENTIAL METHOD BLD: ABNORMAL
EOSINOPHIL # BLD: 0.3 K/UL (ref 0–0.4)
EOSINOPHIL NFR BLD: 3 % (ref 0–5)
ERYTHROCYTE [DISTWIDTH] IN BLOOD BY AUTOMATED COUNT: 13.9 % (ref 11.6–14.5)
HCT VFR BLD AUTO: 33.8 % (ref 35–45)
HGB BLD-MCNC: 11.1 G/DL (ref 12–16)
LYMPHOCYTES # BLD: 2.9 K/UL (ref 0.9–3.6)
LYMPHOCYTES NFR BLD: 30 % (ref 21–52)
MAGNESIUM SERPL-MCNC: 1.4 MG/DL (ref 1.6–2.6)
MCH RBC QN AUTO: 30.8 PG (ref 24–34)
MCHC RBC AUTO-ENTMCNC: 32.8 G/DL (ref 31–37)
MCV RBC AUTO: 93.9 FL (ref 74–97)
MONOCYTES # BLD: 0.8 K/UL (ref 0.05–1.2)
MONOCYTES NFR BLD: 8 % (ref 3–10)
NEUTS SEG # BLD: 5.6 K/UL (ref 1.8–8)
NEUTS SEG NFR BLD: 59 % (ref 40–73)
P-R INTERVAL, ECG05: 140 MS
PHOSPHATE SERPL-MCNC: 3.7 MG/DL (ref 2.5–4.9)
PLATELET # BLD AUTO: 159 K/UL (ref 135–420)
PMV BLD AUTO: 10.1 FL (ref 9.2–11.8)
Q-T INTERVAL, ECG07: 332 MS
QRS DURATION, ECG06: 90 MS
QTC CALCULATION (BEZET), ECG08: 455 MS
RBC # BLD AUTO: 3.6 M/UL (ref 4.2–5.3)
VENTRICULAR RATE, ECG03: 113 BPM
WBC # BLD AUTO: 9.5 K/UL (ref 4.6–13.2)

## 2017-08-19 PROCEDURE — 74011250636 HC RX REV CODE- 250/636: Performed by: PHYSICIAN ASSISTANT

## 2017-08-19 PROCEDURE — 85730 THROMBOPLASTIN TIME PARTIAL: CPT | Performed by: PHYSICIAN ASSISTANT

## 2017-08-19 PROCEDURE — 85025 COMPLETE CBC W/AUTO DIFF WBC: CPT | Performed by: PHYSICIAN ASSISTANT

## 2017-08-19 PROCEDURE — 74011250636 HC RX REV CODE- 250/636: Performed by: INTERNAL MEDICINE

## 2017-08-19 PROCEDURE — 84100 ASSAY OF PHOSPHORUS: CPT | Performed by: PHYSICIAN ASSISTANT

## 2017-08-19 PROCEDURE — 74011250637 HC RX REV CODE- 250/637: Performed by: INTERNAL MEDICINE

## 2017-08-19 PROCEDURE — 83735 ASSAY OF MAGNESIUM: CPT | Performed by: PHYSICIAN ASSISTANT

## 2017-08-19 PROCEDURE — 65610000006 HC RM INTENSIVE CARE

## 2017-08-19 PROCEDURE — 74011250636 HC RX REV CODE- 250/636: Performed by: FAMILY MEDICINE

## 2017-08-19 PROCEDURE — 77010033678 HC OXYGEN DAILY

## 2017-08-19 PROCEDURE — 36415 COLL VENOUS BLD VENIPUNCTURE: CPT | Performed by: PHYSICIAN ASSISTANT

## 2017-08-19 RX ORDER — ACETAMINOPHEN 325 MG/1
650 TABLET ORAL
Status: DISCONTINUED | OUTPATIENT
Start: 2017-08-19 | End: 2017-08-23 | Stop reason: HOSPADM

## 2017-08-19 RX ORDER — SODIUM CHLORIDE 9 MG/ML
150 INJECTION, SOLUTION INTRAVENOUS CONTINUOUS
Status: DISPENSED | OUTPATIENT
Start: 2017-08-19 | End: 2017-08-20

## 2017-08-19 RX ORDER — HEPARIN SODIUM 1000 [USP'U]/ML
40 INJECTION, SOLUTION INTRAVENOUS; SUBCUTANEOUS ONCE
Status: COMPLETED | OUTPATIENT
Start: 2017-08-19 | End: 2017-08-19

## 2017-08-19 RX ORDER — HEPARIN SODIUM 1000 [USP'U]/ML
73.5 INJECTION, SOLUTION INTRAVENOUS; SUBCUTANEOUS ONCE
Status: COMPLETED | OUTPATIENT
Start: 2017-08-19 | End: 2017-08-19

## 2017-08-19 RX ORDER — MORPHINE SULFATE 4 MG/ML
2 INJECTION, SOLUTION INTRAMUSCULAR; INTRAVENOUS
Status: DISCONTINUED | OUTPATIENT
Start: 2017-08-19 | End: 2017-08-23 | Stop reason: HOSPADM

## 2017-08-19 RX ORDER — HEPARIN SODIUM 10000 [USP'U]/100ML
18-36 INJECTION, SOLUTION INTRAVENOUS
Status: DISCONTINUED | OUTPATIENT
Start: 2017-08-19 | End: 2017-08-21

## 2017-08-19 RX ADMIN — HEPARIN SODIUM 10000 UNITS: 1000 INJECTION, SOLUTION INTRAVENOUS; SUBCUTANEOUS at 01:01

## 2017-08-19 RX ADMIN — HEPARIN SODIUM AND DEXTROSE 18 UNITS/KG/HR: 10000; 5 INJECTION INTRAVENOUS at 01:02

## 2017-08-19 RX ADMIN — SODIUM CHLORIDE 150 ML/HR: 900 INJECTION, SOLUTION INTRAVENOUS at 11:33

## 2017-08-19 RX ADMIN — ACETAMINOPHEN 650 MG: 325 TABLET ORAL at 13:06

## 2017-08-19 RX ADMIN — HEPARIN SODIUM 5690 UNITS: 1000 INJECTION, SOLUTION INTRAVENOUS; SUBCUTANEOUS at 14:24

## 2017-08-19 RX ADMIN — HEPARIN SODIUM AND DEXTROSE 16 UNITS/KG/HR: 10000; 5 INJECTION INTRAVENOUS at 14:19

## 2017-08-19 RX ADMIN — SODIUM CHLORIDE 150 ML/HR: 900 INJECTION, SOLUTION INTRAVENOUS at 18:10

## 2017-08-19 RX ADMIN — SODIUM CHLORIDE 100 ML/HR: 900 INJECTION, SOLUTION INTRAVENOUS at 02:52

## 2017-08-19 RX ADMIN — Medication 2 MG: at 03:50

## 2017-08-19 NOTE — ED NOTES
Pt ambulated to restroom with this RN with a steady gait in no apparent distress. Pt provided urine specimen.

## 2017-08-19 NOTE — ED NOTES
Pt actively vomiting while this RN was in medicating patient. VAMSI Mcclellan aware and orders were received.

## 2017-08-19 NOTE — PROGRESS NOTES
0745 Assumed care of patient at this time, Heparin drip decreased by 2 units/ Kg/hr per results of PTT. Patient resting in bed, physical assessment completed, call bell within reach, will cont to monitor and assess. 56 Dr Arun Dinh called regarding hypotension, patient currently sitting up in chair, family at bedside, no complaints at this time. Orders received to increase IV fluids to 150 cc/hr. 1305 Tylenol given for pain in lower back. 1340 Lab at bedside for to draw PTT.  1350  Patient assisted back to bed, call bell within reach, will cont to monitor and assess. 1400 resting comfortably. 1419 Heparin drip increased 2 units/kg/hr per results of PTT.  1425 Heparin bolus given as ordered. 1600 Patient assisted oob to bedside chair for dinner. 1800 Patient assisted back into bed,  present. 1920 Bedside and Verbal shift change report given to [address] (oncoming nurse) by Tamanna Castellanos (offgoing nurse).  Report included the following information SBAR, Kardex, Intake/Output, MAR, Accordion, Recent Results, Med Rec Status and Cardiac Rhythm NSR-ST.

## 2017-08-19 NOTE — ED NOTES
Pt ambulated to restroom with this RN. Pt ambulated with a steady gait in no apparent distress. Pt was placed back on cardiac monitor and will continue to monitor. Bed in lowest locked position, side rails up x 2 and call bell in reach of patient and patient instructed to use call bell for any assistance needed.

## 2017-08-19 NOTE — ED NOTES
Pt ambulated to restroom with this RN. Pt ambulated with a steady gait in no apparent distress noted.

## 2017-08-19 NOTE — PROGRESS NOTES
0300 Pt arrived from ED via stretcher. Heparin drip verified with ED nurse. Pt VSS at this time. Denies SOB, pain. On 2L NC. Oriented to room and call bell system. Instructed to call for assistance when out of bed.     0315 Assessment completed. See flowsheet. NAD. Remains on 2L NC.     0400 Resting. NAD.    0600 No acute change. VSS.    0745 Bedside and Verbal shift change report given to JODIE Valdivia RN (oncoming nurse) by Khushbu Downs RN   (offgoing nurse). Report included the following information SBAR, Kardex and MAR.

## 2017-08-19 NOTE — PROGRESS NOTES
conducted an initial consultation and Spiritual Assessment for Lewis Colon, who is a 58 y.o.,female. Patients Primary Language is: [place]. According to the patients EMR Zoroastrian Affiliation is: [facility].     The reason the Patient came to the hospital is:   Patient Active Problem List    Diagnosis Date Noted    Acute saddle pulmonary embolism without acute cor pulmonale ([facility] [address] 08/19/2017    Essential hypertension, benign 08/19/2017    Acquired hypothyroidism 08/19/2017    Superficial thrombophlebitis 08/19/2017      Great conversation with patient. A little nervous about her condition and the fact that she is in the ICU.  and patient have a lot in common with respect to hometown and we shared with each other and established lots of common ground. She has no home Voodoo, and does not regularly attend any devotional activity, but she claims to be faithful. She believes in god and the power of prayer and she experience the worse in Voodoo people years ago which drove her away from organized Hindu. How many times have we heard the same story? She was open to pastoral care and prayer. The  provided the following Interventions:  Initiated a relationship of care and support. Explored issues of marsha, belief, spirituality and Christianity/ritual needs while hospitalized. Listened empathically. Provided chaplaincy education. Provided information about Spiritual Care Services. Offered prayer, and assurance of prayer on patient's behalf. Chart reviewed. The following outcomes where achieved:  Patient shared limited information about both their medical narrative and spiritual journey/beliefs.  confirmed Patient's Zoroastrian Affiliation. Patient processed feeling about current hospitalization. Patient expressed gratitude for 's visit.     Assessment:  Patient does not have any Christianity/cultural needs that will affect patients preferences in health care.      Plan:  Chaplains will continue to follow and will provide pastoral care on an as needed/requested basis.  recommends bedside caregivers page  on duty if patient shows signs of acute spiritual or emotional distress.     The [address] [place] [address]  [phone]

## 2017-08-19 NOTE — ED NOTES
Pt returns from CT and fluids have been restarted. Pt remains on cardiac monitor and will continue to monitor patient. Bed in lowest locked position, side rails up x 2 and call bell in reach of patient and patient instructed to use call bell for any assistance needed.

## 2017-08-19 NOTE — H&P
History & Physical    Patient: Marilynn Bryan MRN: [id number]  CSN: [id number]    YOB: 1955  Age: 58 y.o. Sex: female      DOA: 8/18/2017  Primary Care Provider:  Katelynn Salcido MD      Assessment/Plan     1. Acute saddle pulmonary embolism, she is hemodynamically stable  2. Great saphenous venous thrombosis   3. HTN  4. CKD 3  5. hypothyroidism    PLAN:  - admit to intensive care unit  - continue heparin gtt per protocol  - 2d echo  - hold home antihypertensives for now  - hold diuretic and start fluids  - full code          Patient Active Problem List   Diagnosis Code    Acute saddle pulmonary embolism without acute cor pulmonale (HCC) I26.92    Essential hypertension, benign I10    Acquired hypothyroidism E03.9    Superficial thrombophlebitis I80.9     HPI:   CC: shortness of breath   Marilynn Bryan is a 58 y.o. female with past medical history significant for HTN, hypothyroidism presents to the ER with acute onset of shortness of breath. She had noted pain in her leg which started about 1 week ago. She was seen by Dr Cathleen Reyes and treated for dermatitis with no improvement in her symptoms. I saw her in the office yesterday and she was noted to have superficial venous thrombosis and supportive treatment started but she had progressive pain in her legs and SMITH. She states that her dyspnea is associated w chest tightness but no pain, dyspnea at rest, fever, chills, cough. SHe has no history of vte in past but does have a lengthy commute to [place] daily. On presentation to the ER she was tachycardic in 120s, normotensive with out hypoxia. Exam pertinent for tachycardia, clear lungs andtenderness/ erythema to right lower extremity. Cr minimally elevated. CTA with bilateral pulmonary embolism with thin saddle component. Medicine is asked to admit for further management.        Past Medical History:   Diagnosis Date    Arthritis     Cancer [facility])     kidney right    Hypertension     Morbid obesity ([facility] [address]     Thyroid disease     hypothyroidism     Past Surgical History:   Procedure Laterality Date    HX GI      jeannie    HX HYSTERECTOMY      HX OOPHORECTOMY      HX ORTHOPAEDIC      left ankle fx repair    HX UROLOGICAL      right kidney removed      Social History   Substance Use Topics    Smoking status: Never Smoker    Smokeless tobacco: Never Used    Alcohol use No     History reviewed. No pertinent family history. No current facility-administered medications on file prior to encounter. Current Outpatient Prescriptions on File Prior to Encounter   Medication Sig Dispense Refill    levothyroxine (SYNTHROID) 200 mcg tablet Take 200 mcg by mouth Daily (before breakfast).  furosemide (LASIX) 20 mg tablet Take 20 mg by mouth daily.  lisinopril (PRINIVIL, ZESTRIL) 40 mg tablet Take 40 mg by mouth daily.  ibuprofen (MOTRIN) 200 mg tablet Take 200 mg by mouth daily.         No Known Allergies        Review of Systems  Constitutional: No fever, chills, diaphoresis, malaise, fatigue or weight gain/loss or falls  Skin: no itching or rashes  HEENT: no neck stiffness, hearing loss, tinnitus, epistaxis or sore throat  EYES: no blurry vision, double vision or photophobia  CARDIOVASCULAR: + cp, -palpitations, orthopnea, pnd + LE edema  PULMONARY: no cough, wheeze, +shortness of breath - sputum production  GI: no nausea, vomiting, diarrhea, abdominal pain, melena, hematemesis or brbpr  : no dysuria, hematuria  MUSCULOSKELETAL: no back pain, joint pain or myalgias  ENDOCRINE: no heat/cold intolerance, polyuria or polydipsia  HEME: no easy bruising or bleeding  NEURO: no unilateral weakness, numbness, tingling or seizures      Physical Exam:        Visit Vitals    /63    Pulse 100    Temp 98.3 °F (36.8 °C)    Resp 18    Ht 5' 6\" (1.676 m)    Wt 136.1 kg (300 lb)    SpO2 98%    BMI 48.42 kg/m2      O2 Device: Room air    Temp (24hrs), Av.3 °F (36.8 °C), Min:98.3 °F (36.8 °C), Max:98.3 °F (36.8 °C)           Body mass index is 48.42 kg/(m^2). General:  Awake, cooperative, no distress. Head:  Normocephalic, without obvious abnormality, atraumatic. Eyes:  Conjunctivae/corneas clear, sclera anicteric, PERRL, EOMs intact. Nose: Nares normal. No drainage or sinus tenderness. Throat: Lips, mucosa, and tongue normal. .   Neck: Supple, symmetrical, trachea midline, no adenopathy. Lungs:   Clear to auscultation bilaterally, equal expansion       Heart:  Tachycardic, regular S1, S2 normal, no murmur, click, rub or gallop, cap refill normal      Abdomen: Soft, non-tender. Bowel sounds normal. No masses,  No organomegaly. Extremities:  atraumatic, no cyanosis+1 R>L edema, erythema noted   Pulses: 2+ and symmetric all extremities. Skin: Skin color pale, texture, turgor normal. No rashes or lesions   Neurologic: CNII-XII intact. No focal motor or sensory deficit.            Laboratory Studies:    CMP:   Lab Results   Component Value Date/Time     08/18/2017 07:02 PM    K 4.6 08/18/2017 07:02 PM     08/18/2017 07:02 PM    CO2 28 08/18/2017 07:02 PM    AGAP 10 08/18/2017 07:02 PM     (H) 08/18/2017 07:02 PM    BUN 25 (H) 08/18/2017 07:02 PM    CREA 1.48 (H) 08/18/2017 07:02 PM    GFRAA 43 (L) 08/18/2017 07:02 PM    GFRNA 36 (L) 08/18/2017 07:02 PM    CA 9.4 08/18/2017 07:02 PM    MG 1.6 08/18/2017 07:02 PM    ALB 3.9 08/18/2017 07:02 PM    TP 8.2 08/18/2017 07:02 PM    GLOB 4.3 (H) 08/18/2017 07:02 PM    AGRAT 0.9 08/18/2017 07:02 PM    SGOT 48 (H) 08/18/2017 07:02 PM    ALT 52 08/18/2017 07:02 PM     CBC:   Lab Results   Component Value Date/Time    WBC 11.1 08/18/2017 07:02 PM    HGB 14.2 08/18/2017 07:02 PM    HCT 42.8 08/18/2017 07:02 PM     08/18/2017 07:02 PM     COAGS:   Lab Results   Component Value Date/Time    APTT 26.4 08/18/2017 07:45 PM    PTP 13.0 08/18/2017 07:45 PM    INR 1.0 08/18/2017 07:45 PM       Imaging studies personally reviewed:    CTA chest:\" Bilateral pulmonary embolism with a thin saddle component.     Splenomegaly.     7 mm right upper lobe pulmonary nodule which appears to contain fat and possibly  an adenoma. Consider a 6 month follow-up. \"    CXR: clear    EKG: sinus tach    Barbra Ackerman, DO  Internal Medicine/Geriatrics

## 2017-08-19 NOTE — ED NOTES
Alerted to patients room via call bell. Pt is requesting something to drink, PA aware and informed patient that NPO until CTA results are confirmed. Pt verbalized agreement.

## 2017-08-19 NOTE — ROUTINE PROCESS
TRANSFER - OUT REPORT:    Verbal report given to Stuart Vines RN (name) on [place] [id number]  being transferred to ICU (unit) for routine progression of care       Report consisted of patients Situation, Background, Assessment and   Recommendations(SBAR). Information from the following report(s) SBAR, Kardex, ED Summary, MAR, Recent Results and Cardiac Rhythm Sinus Tach was reviewed with the receiving nurse. Lines:   Peripheral IV 08/18/17 Right Antecubital (Active)   Site Assessment Clean, dry, & intact 8/18/2017  7:03 PM   Phlebitis Assessment 0 8/18/2017  7:03 PM   Infiltration Assessment 0 8/18/2017  7:03 PM   Dressing Status Clean, dry, & intact 8/18/2017  7:03 PM   Dressing Type Transparent 8/18/2017  7:03 PM   Hub Color/Line Status Patent; Flushed 8/18/2017  7:03 PM   Action Taken Blood drawn 8/18/2017  7:03 PM       Peripheral IV 08/19/17 Left Arm (Active)   Site Assessment Clean, dry, & intact 8/19/2017  1:05 AM   Phlebitis Assessment 0 8/19/2017  1:05 AM   Infiltration Assessment 0 8/19/2017  1:05 AM   Dressing Status Clean, dry, & intact 8/19/2017  1:05 AM   Dressing Type Transparent 8/19/2017  1:05 AM   Hub Color/Line Status Pink 8/19/2017  1:05 AM   Alcohol Cap Used Yes 8/19/2017  1:05 AM        Opportunity for questions and clarification was provided.       Patient transported with:   Monitor  O2 @ 2 liters  Registered Nurse  [facility]

## 2017-08-20 LAB
ALBUMIN SERPL-MCNC: 2.6 G/DL (ref 3.4–5)
ALBUMIN/GLOB SERPL: 0.8 {RATIO} (ref 0.8–1.7)
ALP SERPL-CCNC: 92 U/L (ref 45–117)
ALT SERPL-CCNC: 39 U/L (ref 13–56)
ANION GAP SERPL CALC-SCNC: 5 MMOL/L (ref 3–18)
APTT PPP: 118.9 SEC (ref 23–36.4)
APTT PPP: 65.3 SEC (ref 23–36.4)
APTT PPP: 75.8 SEC (ref 23–36.4)
APTT PPP: 75.9 SEC (ref 23–36.4)
AST SERPL-CCNC: 27 U/L (ref 15–37)
BASOPHILS # BLD: 0 K/UL (ref 0–0.06)
BASOPHILS NFR BLD: 1 % (ref 0–2)
BILIRUB SERPL-MCNC: 0.6 MG/DL (ref 0.2–1)
BUN SERPL-MCNC: 20 MG/DL (ref 7–18)
BUN/CREAT SERPL: 19 (ref 12–20)
CALCIUM SERPL-MCNC: 7.8 MG/DL (ref 8.5–10.1)
CHLORIDE SERPL-SCNC: 106 MMOL/L (ref 100–108)
CK MB CFR SERPL CALC: ABNORMAL % (ref 0–4)
CK MB SERPL-MCNC: <1 NG/ML (ref 5–25)
CK SERPL-CCNC: 25 U/L (ref 26–192)
CO2 SERPL-SCNC: 27 MMOL/L (ref 21–32)
CREAT SERPL-MCNC: 1.08 MG/DL (ref 0.6–1.3)
DIFFERENTIAL METHOD BLD: ABNORMAL
EOSINOPHIL # BLD: 0.3 K/UL (ref 0–0.4)
EOSINOPHIL NFR BLD: 4 % (ref 0–5)
ERYTHROCYTE [DISTWIDTH] IN BLOOD BY AUTOMATED COUNT: 13.8 % (ref 11.6–14.5)
GLOBULIN SER CALC-MCNC: 3.2 G/DL (ref 2–4)
GLUCOSE SERPL-MCNC: 119 MG/DL (ref 74–99)
HCT VFR BLD AUTO: 32 % (ref 35–45)
HGB BLD-MCNC: 10.4 G/DL (ref 12–16)
LYMPHOCYTES # BLD: 2.2 K/UL (ref 0.9–3.6)
LYMPHOCYTES NFR BLD: 28 % (ref 21–52)
MAGNESIUM SERPL-MCNC: 2.1 MG/DL (ref 1.6–2.6)
MCH RBC QN AUTO: 30.9 PG (ref 24–34)
MCHC RBC AUTO-ENTMCNC: 32.5 G/DL (ref 31–37)
MCV RBC AUTO: 95 FL (ref 74–97)
MONOCYTES # BLD: 0.6 K/UL (ref 0.05–1.2)
MONOCYTES NFR BLD: 8 % (ref 3–10)
NEUTS SEG # BLD: 4.7 K/UL (ref 1.8–8)
NEUTS SEG NFR BLD: 59 % (ref 40–73)
PLATELET # BLD AUTO: 136 K/UL (ref 135–420)
PMV BLD AUTO: 9.9 FL (ref 9.2–11.8)
POTASSIUM SERPL-SCNC: 3.8 MMOL/L (ref 3.5–5.5)
PROT SERPL-MCNC: 5.8 G/DL (ref 6.4–8.2)
RBC # BLD AUTO: 3.37 M/UL (ref 4.2–5.3)
SODIUM SERPL-SCNC: 138 MMOL/L (ref 136–145)
T4 FREE SERPL-MCNC: 1.2 NG/DL (ref 0.7–1.5)
TROPONIN I SERPL-MCNC: <0.02 NG/ML (ref 0–0.06)
TSH SERPL DL<=0.05 MIU/L-ACNC: 0.01 UIU/ML (ref 0.36–3.74)
WBC # BLD AUTO: 8 K/UL (ref 4.6–13.2)

## 2017-08-20 PROCEDURE — 82550 ASSAY OF CK (CPK): CPT | Performed by: INTERNAL MEDICINE

## 2017-08-20 PROCEDURE — 77010033678 HC OXYGEN DAILY

## 2017-08-20 PROCEDURE — 93306 TTE W/DOPPLER COMPLETE: CPT

## 2017-08-20 PROCEDURE — 74011250636 HC RX REV CODE- 250/636: Performed by: FAMILY MEDICINE

## 2017-08-20 PROCEDURE — 74011250637 HC RX REV CODE- 250/637: Performed by: INTERNAL MEDICINE

## 2017-08-20 PROCEDURE — 86146 BETA-2 GLYCOPROTEIN ANTIBODY: CPT | Performed by: FAMILY MEDICINE

## 2017-08-20 PROCEDURE — 84439 ASSAY OF FREE THYROXINE: CPT | Performed by: FAMILY MEDICINE

## 2017-08-20 PROCEDURE — 85613 RUSSELL VIPER VENOM DILUTED: CPT | Performed by: FAMILY MEDICINE

## 2017-08-20 PROCEDURE — 86038 ANTINUCLEAR ANTIBODIES: CPT | Performed by: FAMILY MEDICINE

## 2017-08-20 PROCEDURE — 85025 COMPLETE CBC W/AUTO DIFF WBC: CPT | Performed by: INTERNAL MEDICINE

## 2017-08-20 PROCEDURE — 74011250636 HC RX REV CODE- 250/636: Performed by: INTERNAL MEDICINE

## 2017-08-20 PROCEDURE — 84443 ASSAY THYROID STIM HORMONE: CPT | Performed by: FAMILY MEDICINE

## 2017-08-20 PROCEDURE — 85730 THROMBOPLASTIN TIME PARTIAL: CPT | Performed by: INTERNAL MEDICINE

## 2017-08-20 PROCEDURE — 65610000006 HC RM INTENSIVE CARE

## 2017-08-20 PROCEDURE — 83735 ASSAY OF MAGNESIUM: CPT | Performed by: INTERNAL MEDICINE

## 2017-08-20 PROCEDURE — 36415 COLL VENOUS BLD VENIPUNCTURE: CPT | Performed by: INTERNAL MEDICINE

## 2017-08-20 PROCEDURE — 80053 COMPREHEN METABOLIC PANEL: CPT | Performed by: INTERNAL MEDICINE

## 2017-08-20 PROCEDURE — 86147 CARDIOLIPIN ANTIBODY EA IG: CPT | Performed by: FAMILY MEDICINE

## 2017-08-20 PROCEDURE — 74011250636 HC RX REV CODE- 250/636: Performed by: PHYSICIAN ASSISTANT

## 2017-08-20 PROCEDURE — 85730 THROMBOPLASTIN TIME PARTIAL: CPT | Performed by: PHYSICIAN ASSISTANT

## 2017-08-20 RX ORDER — LEVOTHYROXINE SODIUM 200 UG/1
200 TABLET ORAL
Status: DISCONTINUED | OUTPATIENT
Start: 2017-08-21 | End: 2017-08-23 | Stop reason: HOSPADM

## 2017-08-20 RX ORDER — SODIUM CHLORIDE 9 MG/ML
100 INJECTION, SOLUTION INTRAVENOUS CONTINUOUS
Status: DISPENSED | OUTPATIENT
Start: 2017-08-20 | End: 2017-08-21

## 2017-08-20 RX ORDER — MAGNESIUM SULFATE 1 G/100ML
1 INJECTION INTRAVENOUS ONCE
Status: COMPLETED | OUTPATIENT
Start: 2017-08-20 | End: 2017-08-20

## 2017-08-20 RX ORDER — HEPARIN SODIUM 1000 [USP'U]/ML
40 INJECTION, SOLUTION INTRAVENOUS; SUBCUTANEOUS ONCE
Status: COMPLETED | OUTPATIENT
Start: 2017-08-20 | End: 2017-08-20

## 2017-08-20 RX ORDER — MAGNESIUM SULFATE HEPTAHYDRATE 40 MG/ML
2 INJECTION, SOLUTION INTRAVENOUS ONCE
Status: COMPLETED | OUTPATIENT
Start: 2017-08-20 | End: 2017-08-21

## 2017-08-20 RX ADMIN — HEPARIN SODIUM AND DEXTROSE 14 UNITS/KG/HR: 10000; 5 INJECTION INTRAVENOUS at 20:35

## 2017-08-20 RX ADMIN — SODIUM CHLORIDE 150 ML/HR: 900 INJECTION, SOLUTION INTRAVENOUS at 00:51

## 2017-08-20 RX ADMIN — MAGNESIUM SULFATE HEPTAHYDRATE 2 G: 40 INJECTION, SOLUTION INTRAVENOUS at 12:00

## 2017-08-20 RX ADMIN — ACETAMINOPHEN 650 MG: 325 TABLET ORAL at 00:51

## 2017-08-20 RX ADMIN — ACETAMINOPHEN 650 MG: 325 TABLET ORAL at 18:13

## 2017-08-20 RX ADMIN — SODIUM CHLORIDE 150 ML/HR: 900 INJECTION, SOLUTION INTRAVENOUS at 12:01

## 2017-08-20 RX ADMIN — MAGNESIUM SULFATE HEPTAHYDRATE 1 G: 1 INJECTION, SOLUTION INTRAVENOUS at 12:00

## 2017-08-20 RX ADMIN — HEPARIN SODIUM AND DEXTROSE 14 UNITS/KG/HR: 10000; 5 INJECTION INTRAVENOUS at 04:22

## 2017-08-20 RX ADMIN — HEPARIN SODIUM 5690 UNITS: 1000 INJECTION, SOLUTION INTRAVENOUS; SUBCUTANEOUS at 14:29

## 2017-08-20 NOTE — PROGRESS NOTES
1940 Heparin drip verified with offgoing RN. 2000 Assessment completed. Pt VSS. Denies SOB, chest pain. On 2L NC. IVFs infusing without difficulty. Able to ambulate to bedside commode with little assistance. Expresses understanding of need to tracey for help.     0000 Reassessment completed. No change. VSS.     0300 Hep therapeutic. No change in rate. 0400 Resting comfortable. Reassessment completed. No acute changes.

## 2017-08-20 NOTE — PROGRESS NOTES
Hospitalist Progress Note    Patient: Irma Estrella MRN: [id number]  CSN: [id number]    YOB: 1955  Age: 58 y.o. Sex: female    DOA: 8/18/2017 LOS:  LOS: 1 day          Chief Complaint:    Dyspnea    Assessment/Plan     Hospital Problems  Date Reviewed: 8/19/2017          Codes Class Noted POA    * (Principal)Acute saddle pulmonary embolism without acute cor pulmonale (HCC) ICD-10-CM: I26.92  ICD-9-CM: 415.13  8/19/2017 Yes        Essential hypertension, benign ICD-10-CM: I10  ICD-9-CM: 401.1  8/19/2017 Yes        Acquired hypothyroidism ICD-10-CM: E03.9  ICD-9-CM: 244.9  8/19/2017 Yes        Superficial thrombophlebitis ICD-10-CM: I80.9  ICD-9-CM: 451.9  8/19/2017 Yes              Irma Estrella is a 58 y.o. female who was admitted on 8/19/17 w/ acute pulmonary embolism after presenting to the ED w/ chest pain. She is in the ICU on a heparin drip. Cardiology consulted. Echo ordered and pending to eval R heart strain.         Acute saddle pulmonary embolism without acute cor pulmonale (HCC) (8/19/2017)  - heparin drip  - supportive care  - cards consult  - maintain preload  - echo  - transition to oral anticoag when stabilized  - hypercoag w/u      Essential hypertension, benign (8/19/2017)  - hypotensive  - hold bp meds  - maintain preload w/ NS      Acquired hypothyroidism (8/19/2017)  - thyroid function studies ordered today  - restart synthroid    Replete mag    PT/OT     CM for DC planning    Dispo: Continue ICU care; cards eval      Subjective:    No complaints; BP soft      Review of systems:    Constitutional: denies fevers, chills, myalgias, diaphoresis  Respiratory: denies shortness of breath, cough, wheezing  Cardiovascular: denies chest pain, palpitations  Gastrointestinal: denies nausea, vomiting, diarrhea, constipation      Vital signs/Intake and Output:  Visit Vitals    BP 90/47    Pulse 67    Temp 97.9 °F (36.6 °C)    Resp 18    Ht 5' 6\" (1.676 m)    Wt 142.3 kg (313 lb 11.4 oz)    SpO2 100%    BMI 50.63 kg/m2     Current Shift:  08/20 0701 - 08/20 1900  In: -   Out: 350 [Urine:350]  Last three shifts:  08/18 1901 - 08/20 0700  In: 4127.7 [P.O.:1880;  I.V.:2247.7]  Out: 2700 [Urine:2700]    Exam:    General: Awake and alert, no acute distress, resting comfortably in bed  Head: Atraumatic, normocephalic  Eyes: PERRLA, EOMI, sclerae non-icteric  ENT: mucous membranes moist, palate elevates evenly, tongue midline  Neck: supple, no masses, no lymphadenopathy, no rigidity   CVS:Regular rate and rhythm, no murmurs, rubs, gallops, or clicks  Lungs:Clear to auscultation bilaterally, no wheezes, rales, or rhonchi  Abdomen: Soft, Nontender, nondistended, bowel sounds normal throughout  Extremities: atraumatic, pulses 2+ all ext; strength 5/5 all ext, + LE edema; RLE medial venous stasis  Skin: warm, dry, well perfused, without rash; no cyanosis or clubbing  Neuro: CN II-XII grossly intact, no focal neurologic deficits  Psych: awake, alert, and oriented x 3; full range of mood and affect                Labs: Results:       Chemistry Recent Labs      08/20/17 0252 08/18/17 1902   GLU  119*  106*   NA  138  143   K  3.8  4.6   CL  106  105   CO2  27  28   BUN  20*  25*   CREA  1.08  1.48*   CA  7.8*  9.4   AGAP  5  10   BUCR  19  17   AP  92  126*   TP  5.8*  8.2   ALB  2.6*  3.9   GLOB  3.2  4.3*   AGRAT  0.8  0.9      CBC w/Diff Recent Labs      08/20/17   0252 08/19/17   0705 08/18/17 1902   WBC  8.0  9.5  11.1   RBC  3.37*  3.60*  4.58   HGB  10.4*  11.1*  14.2   HCT  32.0*  33.8*  42.8   PLT  136  159  193   GRANS  59  59  67   LYMPH  28  30  24   EOS  4  3  3      Cardiac Enzymes Recent Labs      08/18/17   1902   CPK  32   CKND1  CALCULATION NOT PERFORMED WHEN RESULT IS BELOW LINEAR LIMIT      Coagulation Recent Labs      08/20/17   0632  08/20/17   0252   08/18/17   1945   PTP   --    --    --   13.0   INR   --    --    --   1.0   APTT  75.8*  75.9*   < >  26.4    < > = values in this interval not displayed. Lipid Panel No results found for: CHOL, CHOLPOCT, CHOLX, CHLST, CHOLV, 376499, HDL, LDL, LDLC, DLDLP, 417889, VLDLC, VLDL, TGLX, TRIGL, TRIGP, TGLPOCT, CHHD, CHHDX   BNP No results for input(s): BNPP in the last 72 hours.    Liver Enzymes Recent Labs      08/20/17   0252   TP  5.8*   ALB  2.6*   AP  92   SGOT  27      Thyroid Studies Lab Results   Component Value Date/Time    TSH 5.68 09/21/2009 11:07 AM        Procedures/imaging: see electronic medical records for all procedures/Xrays and details which were not copied into this note but were reviewed prior to creation of Michaela 9293, DO

## 2017-08-20 NOTE — ROUTINE PROCESS
Bedside, Verbal and Written shift change report given to MICKEY Garces RN (oncoming nurse) by Jessica Pacheco. Terrence Rosas RN (offgoing nurse). Report included the following information SBAR, Kardex, Intake/Output and Recent Results. Assumed care of pt at this time. Pt aox4, following commands, denies chest pain and leg pain this time, remains on telemetry, 2LNC, Heparin gtt + IV fluids, up at sridhar. 1000  Scheduled med's given, pt tolerated well.   1300  Ptt low, Heparin bolus given, rate increased per protocol, witnessed by Fam Castillo RN.  1400  Dr. Jessica Pacheco. Tammy at bedside assessing pt. Orders received. 1600  Pt assisted to bedside commode and back to bed, IV fluids changed. VSS. 1845  Pt status unchanged, sitting up in bed, Ptt 118, protocol followed. Pt tolerated well. Family at bedside. 1915  Bedside, Verbal and Written shift change report given to KOLE Rosas RN (oncoming nurse) by Richar Garces (offgoing nurse). Report included the following information SBAR, Kardex, Intake/Output and Recent Results.

## 2017-08-21 LAB
ALBUMIN SERPL-MCNC: 2.5 G/DL (ref 3.4–5)
ALBUMIN/GLOB SERPL: 0.8 {RATIO} (ref 0.8–1.7)
ALP SERPL-CCNC: 90 U/L (ref 45–117)
ALT SERPL-CCNC: 35 U/L (ref 13–56)
ANION GAP SERPL CALC-SCNC: 6 MMOL/L (ref 3–18)
APTT PPP: 76.8 SEC (ref 23–36.4)
APTT PPP: 77.1 SEC (ref 23–36.4)
AST SERPL-CCNC: 23 U/L (ref 15–37)
BASOPHILS # BLD: 0 K/UL (ref 0–0.06)
BASOPHILS NFR BLD: 0 % (ref 0–2)
BILIRUB SERPL-MCNC: 0.4 MG/DL (ref 0.2–1)
BUN SERPL-MCNC: 10 MG/DL (ref 7–18)
BUN/CREAT SERPL: 11 (ref 12–20)
CALCIUM SERPL-MCNC: 8 MG/DL (ref 8.5–10.1)
CHLORIDE SERPL-SCNC: 111 MMOL/L (ref 100–108)
CO2 SERPL-SCNC: 25 MMOL/L (ref 21–32)
CREAT SERPL-MCNC: 0.88 MG/DL (ref 0.6–1.3)
DIFFERENTIAL METHOD BLD: ABNORMAL
EOSINOPHIL # BLD: 0.3 K/UL (ref 0–0.4)
EOSINOPHIL NFR BLD: 5 % (ref 0–5)
ERYTHROCYTE [DISTWIDTH] IN BLOOD BY AUTOMATED COUNT: 13.6 % (ref 11.6–14.5)
GLOBULIN SER CALC-MCNC: 3.1 G/DL (ref 2–4)
GLUCOSE SERPL-MCNC: 100 MG/DL (ref 74–99)
HCT VFR BLD AUTO: 31.2 % (ref 35–45)
HGB BLD-MCNC: 10 G/DL (ref 12–16)
LYMPHOCYTES # BLD: 1.9 K/UL (ref 0.9–3.6)
LYMPHOCYTES NFR BLD: 32 % (ref 21–52)
MCH RBC QN AUTO: 30.5 PG (ref 24–34)
MCHC RBC AUTO-ENTMCNC: 32.1 G/DL (ref 31–37)
MCV RBC AUTO: 95.1 FL (ref 74–97)
MONOCYTES # BLD: 0.5 K/UL (ref 0.05–1.2)
MONOCYTES NFR BLD: 8 % (ref 3–10)
NEUTS SEG # BLD: 3.3 K/UL (ref 1.8–8)
NEUTS SEG NFR BLD: 55 % (ref 40–73)
PLATELET # BLD AUTO: 143 K/UL (ref 135–420)
PMV BLD AUTO: 10.5 FL (ref 9.2–11.8)
POTASSIUM SERPL-SCNC: 4 MMOL/L (ref 3.5–5.5)
PROT SERPL-MCNC: 5.6 G/DL (ref 6.4–8.2)
RBC # BLD AUTO: 3.28 M/UL (ref 4.2–5.3)
SODIUM SERPL-SCNC: 142 MMOL/L (ref 136–145)
WBC # BLD AUTO: 5.9 K/UL (ref 4.6–13.2)

## 2017-08-21 PROCEDURE — 74011250637 HC RX REV CODE- 250/637: Performed by: INTERNAL MEDICINE

## 2017-08-21 PROCEDURE — 80053 COMPREHEN METABOLIC PANEL: CPT | Performed by: INTERNAL MEDICINE

## 2017-08-21 PROCEDURE — 74011250637 HC RX REV CODE- 250/637: Performed by: FAMILY MEDICINE

## 2017-08-21 PROCEDURE — 65610000006 HC RM INTENSIVE CARE

## 2017-08-21 PROCEDURE — 74011250636 HC RX REV CODE- 250/636: Performed by: FAMILY MEDICINE

## 2017-08-21 PROCEDURE — 85730 THROMBOPLASTIN TIME PARTIAL: CPT | Performed by: PHYSICIAN ASSISTANT

## 2017-08-21 PROCEDURE — 77010033678 HC OXYGEN DAILY

## 2017-08-21 PROCEDURE — 85025 COMPLETE CBC W/AUTO DIFF WBC: CPT | Performed by: INTERNAL MEDICINE

## 2017-08-21 PROCEDURE — 36415 COLL VENOUS BLD VENIPUNCTURE: CPT | Performed by: PHYSICIAN ASSISTANT

## 2017-08-21 PROCEDURE — 74011250637 HC RX REV CODE- 250/637: Performed by: HOSPITALIST

## 2017-08-21 RX ADMIN — SODIUM CHLORIDE 150 ML/HR: 900 INJECTION, SOLUTION INTRAVENOUS at 06:21

## 2017-08-21 RX ADMIN — RIVAROXABAN 15 MG: 15 TABLET, FILM COATED ORAL at 11:39

## 2017-08-21 RX ADMIN — ACETAMINOPHEN 650 MG: 325 TABLET ORAL at 14:17

## 2017-08-21 RX ADMIN — ACETAMINOPHEN 650 MG: 325 TABLET ORAL at 01:45

## 2017-08-21 RX ADMIN — RIVAROXABAN 15 MG: 15 TABLET, FILM COATED ORAL at 17:33

## 2017-08-21 RX ADMIN — LEVOTHYROXINE SODIUM 200 MCG: 200 TABLET ORAL at 08:02

## 2017-08-21 NOTE — PROGRESS NOTES
1930 Heparin drip verified with offgoing RN. Assessment completed. Pt VSS. Denies SOB, chest pain. On 2L NC. IVFs infusing without difficulty. Able to ambulate to bedside commode with little assistance. Expresses understanding of need to call for help.      0000 Reassessment completed. No change. VSS.    0130 PTT therapeutic. No change in rate.     0330 Resting comfortable. Reassessment completed. No acute changes. Bedside and Verbal shift change report given to JODIE Larkin RN (oncoming nurse) by Bj Barcenas RN     (offgoing nurse).  Report included the following information SBAR, Kardex and MAR.

## 2017-08-21 NOTE — PROGRESS NOTES
Cardiology Progress Note        Patient: Denise Deleon        Sex: female          DOA: 8/18/2017  YOB: 1955      Age:  58 y.o.        LOS:  LOS: 2 days   Assessment/Plan     Principal Problem:    Acute saddle pulmonary embolism without acute cor pulmonale (HCC) (8/19/2017)    Active Problems:    Essential hypertension, benign (8/19/2017)      Acquired hypothyroidism (8/19/2017)      Superficial thrombophlebitis (8/19/2017)        Plan:[facility] tele: SR Arreola Cheryl well  BP stable now  On xarelto  May need in patient or out patient hematology consult  Discussed with patient & wife. F/u in cardiology in one month  I will sign off and please call if any questions/concerned                    Subjective:    cc:  Feeling better      REVIEW OF SYSTEMS: NO CP, SOB, N,V,D, F,C  Objective:      Visit Vitals    /50    Pulse 75    Temp 98.4 °F (36.9 °C)    Resp 16    Ht 5' 6\" (1.676 m)    Wt 142.3 kg (313 lb 11.4 oz)    SpO2 100%    BMI 50.63 kg/m2     Body mass index is 50.63 kg/(m^2). Physical Exam:  General Appearance: Comfortable, not using accessory muscles of respiration. HEENT: TAMIE. HEAD: Atraumatic  NECK: No JVD,   LUNGS: Clear bilaterally. HEART: S1+S2 audible, no murmur, no pericardial rub. ABD: Non-tender, BS Audible    EXT: No edema, and no cysnosis. VASCULAR EXAM: Pulses are intact. PSYCHIATRIC EXAM: Mood is appropriate. .  Medication:  Current Facility-Administered Medications   Medication Dose Route Frequency    rivaroxaban (XARELTO) tablet 15 mg  15 mg Oral BID WITH MEALS    levothyroxine (SYNTHROID) tablet 200 mcg  200 mcg Oral ACB    acetaminophen (TYLENOL) tablet 650 mg  650 mg Oral Q4H PRN    morphine injection 2 mg  2 mg IntraVENous Q4H PRN               Lab/Data Reviewed: All lab results for the last 24 hours reviewed.      Recent Labs      08/21/17   0500  08/20/17   0252  08/19/17   0705   WBC  5.9  8.0  9.5   HGB  10.0*  10.4* 11.1*   HCT  31.2*  32.0*  33.8*   PLT  143  136  159     Recent Labs      08/21/17   0500  08/20/17   0252   NA  142  138   K  4.0  3.8   CL  111*  106   CO2  25  27   GLU  100*  119*   BUN  10  20*   CREA  0.88  1.08   CA  8.0*  7.8*       Signed By: Ronni Flores MD     August 21, 2017

## 2017-08-21 NOTE — PROGRESS NOTES
conducted a Follow up consultation and Spiritual Assessment for Kory Lewis, who is a 58 y.o.,female. The  provided the following Interventions:  Continued the relationship of care and support. Listened empathically. Offered prayer and assurance of continued prayer on patients behalf. Chart reviewed. The following outcomes were achieved:  Patient expressed gratitude for pastoral care visit. Assessment:  There are no further spiritual or Orthodoxy issues which require Spiritual Care Services interventions at this time. Plan:  Chaplains will continue to follow and will provide pastoral care on an as needed/requested basis.  recommends bedside caregivers page  on duty if patient shows signs of acute spiritual or emotional distress.      [address], [facility] [place]

## 2017-08-21 NOTE — CONSULTS
[address] [place] [address] Rd    Name:  Go Raman  MR#:  [id number]  :  1955  Account #:  [de-identified]  Date of Adm:  2017  Date of Consultation:  2017      REASON FOR CONSULTATION: Pulmonary embolism and the  patient requiring echocardiogram also. HISTORY OF PRESENT ILLNESS: This patient is a 80-year-old very  pleasant female, morbidly obese patient with a history of hypertension,  hypothyroidism, history of superficial thrombophlebitis, came to the  hospital with shortness of breath, diagnosed with pulmonary embolism,  possible saddle pulmonary embolism. The symptoms started a couple  of weeks ago when patient started having some possible symptoms of  cellulitis in the right arm, treated with the antibiotic treatment and then  on the day before presentation, the patient started having dyspnea on  exertion and right leg pain as well. The patient initially thought it was  associated with hot and humid weather, but could not breathe well. Her   brought the patient and patient was diagnosed with sinus  tachycardia with normotensive without any significant hypoxemia and  then the patient underwent a CT scan of the chest that has shown  pulmonary embolism, saddle emboli, and also has a superficial vein  thrombosis in the right lower extremity. The patient is already on  heparin treatment as started by the hospitalist team. Most of the blood  pressure readings are stable without any significant hypoxemia on 2L. Occasional blood pressure readings are in 90s, and one blood  pressure reading was 85/45 without any symptoms and now the heart  rate is normal sinus rhythm without any hypoxemia and without any  additional symptoms. PAST MEDICAL HISTORY  1. Morbid obesity. 2. Hypertension. 3. Thyroid disorder. 4. History of cancer in the past.    SURGICAL HISTORY: Reviewed and discussed. ALLERGIES: NO KNOWN DRUG ALLERGIES. HOME MEDICATIONS INCLUDED  1. Levothyroxine 200 mcg. 2. Lasix 20 mg.  3. Lisinopril 40 mg.  4. Ibuprofen as needed basis. REVIEW OF SYSTEMS: A 10-point review of systems completed,  positive pertinent findings discussed in the history of present illness. The rest of the systems are negative. SOCIAL HISTORY: Denied any active history of smoking or alcohol or  drug abuse. PHYSICAL EXAMINATION  VITAL SIGNS: At the time of the consultation at this point, the blood  pressure is 124/66, respiration rate is 18 per minute, pulse oximetry is  100% on nasal cannula, heart rate is 88, sinus rhythm without any  significant arrhythmia. HEENT: Head is atraumatic, normocephalic. NECK: Supple. No JVD, no carotid bruits. HEART: S1, S2 audible. LUNGS: Bilateral air entry positive, no added sound. ABDOMEN: Soft, nontender. Bowel sounds audible. EXTREMITIES: No edema. Pulses are palpable. NEUROLOGIC: No focal motor or sensory deficit. DERMATOLOGIC: No skin rash. MUSCULOSKELETAL: No obvious joint deformity. Right leg minimal  erythema resolving as per the patient. LABORATORY DATA: A CT scan has shown bilateral pulmonary  embolism with a thin saddle component. Hemoglobin 10.4. Sodium 138,  potassium 3.8, creatinine is 1.09. Cardiac panel was done, BNP  295. Echocardiogram has shown today that was done, normal LV and  RV function, no real RV strain pattern at this point. PA pressure was 40  mmHg and there is no old echocardiographic to compare as well. EKG sinus tachycardia with mild LVH changes. ASSESSMENT AND PLAN  1. Acute bilateral pulmonary embolism with thin saddle component. 2. Hypertension. 3. Now the blood pressure is borderline. 4. No strain pattern on the echocardiogram.  5. Obesity. 6. Thyroid disorder. I have discussed the plan with the patient and her  who is also  my the patient as well. The patient will need long-term anticoagulation.   We need to find the etiology of deep venous thrombosis and at this  point, the patient is already more than 72 hours. Right now the blood  pressure is good. Heart rate is good. Oxygenation is good. On initial  presentation, the patient was also hemodynamically stable. At this  point, no indication for t-PA treatment, but patient will need close  monitoring and patient has no history of bleeding. I will suggest  considering Xarelto tomorrow 15 mg twice a day for 21 days and then  20 mg daily and may need a hematology evaluation to find the etiology  of deep venous thrombosis as well.         Keerthi Flores MD MA / IVIS  D:  08/20/2017   14:20  T:  08/20/2017   21:03  Job #:  [id number]

## 2017-08-21 NOTE — PROGRESS NOTES
Bedside and Verbal shift change report given to Stephanie Ruggiero Rn (oncoming nurse) by Hansa Mak (offgoing nurse). Report included the following information SBAR, Kardex, OR Summary, Procedure Summary, Intake/Output, MAR, Accordion, Recent Results, Med Rec Status, Cardiac Rhythm NSR and Alarm Parameters .

## 2017-08-21 NOTE — PROGRESS NOTES
conducted a Follow up consultation and Spiritual Assessment for Kory Lewis, who is a 58 y.o.,female.  at the bedside. Both in upbeat, positive spirits. Good support. Most of the family is in [facility]. The  provided the following Interventions:  Continued the relationship of care and support. Listened empathically. Offered prayer and assurance of continued prayer on patients behalf. Chart reviewed. The following outcomes were achieved:  Patient expressed gratitude for Spiritual Care visit. Patient was reviewed in ICU Interdisciplinary Rounds. Assessment:  There are no further spiritual or Worship issues which require Spiritual Care Services intervention at this time. Plan:  Chaplains will continue to follow and will provide pastoral care as needed or requested.  recommends bedside caregivers page the  on duty if patient shows signs of acute spiritual or emotional distress. [address], [place].    Board Certified   [phone] - Office

## 2017-08-21 NOTE — PROGRESS NOTES
Readmission Risk Assessment: Low Risk and [facility]P/[facility] Help [facility] patients    RRAT Score: 1 - 12    Initial Assessment:Emergency Contact: chart reviewed pt admitted for acute saddle pulmonary embolism without acute cor pulmonale,pt lives at home with  ,cm will cont to review chart and remain available. Pertinent Medical Hx: see chartPCP/Specialists: Community Services:     DME:     Low Risk Care Transition Plan:  1. Evaluate for [facility] or [address] coordination of resources  2. Involve patient/caregiver in assessment, planning, education and implement of intervention. 3. CM daily patient care huddles/interdisciplinary rounds. 4. PCP/Specialist appointment within 7 - 10 days made prior to discharge. 5. Facilitate transportation and logistics for follow-up appointments. 6. Handoff to [address] Nurse Navigator or PCP practice.

## 2017-08-21 NOTE — PROGRESS NOTES
Hospitalist Progress Note-critical care note     Patient: Sarah Reyes MRN: [id number]  CSN: [id number]    YOB: 1955  Age: 58 y.o. Sex: female    DOA: 8/18/2017 LOS:  LOS: 2 days            Chief complaint: PE.      Assessment/Plan         Hospital Problems  Date Reviewed: 8/19/2017          Codes Class Noted POA    * (Principal)Acute saddle pulmonary embolism without acute cor pulmonale (HCC) ICD-10-CM: I26.92  ICD-9-CM: 415.13  8/19/2017 Yes        Essential hypertension, benign ICD-10-CM: I10  ICD-9-CM: 401.1  8/19/2017 Yes        Acquired hypothyroidism ICD-10-CM: E03.9  ICD-9-CM: 244.9  8/19/2017 Yes        Superficial thrombophlebitis ICD-10-CM: I80.9  ICD-9-CM: 451.9  8/19/2017 Yes            1. Acute saddle pulmonary embolism without acute cor pulmonale (HCC) (8/19/2017)    bp and sob better   - heparin drip, will stop gtt and start xarelto today   - echo done   - pvl of rt leg: negative for dvt ,but superficial clots of rt LE   -saddle emboli    2. Essential hypertension, benign (8/19/2017)    - hold bp meds due to hypotensive -improving now, will continue hold medication   - maintain preload w/ NS, will decrease to 100        3. Acquired hypothyroidism (8/19/2017)  - on synthroid      discussed with pt about side effect of xarelto and benefit of medication, she agrees to start it     Subjective: sob better, no chest pain   Nurse: bp is better   Review of systems:    General: No fevers or chills. Cardiovascular: No chest pain or pressure. No palpitations. Pulmonary:shortness of breath better   Gastrointestinal: No nausea, vomiting. Vital signs/Intake and Output:  Visit Vitals    /65    Pulse 73    Temp 97.9 °F (36.6 °C)    Resp 13    Ht 5' 6\" (1.676 m)    Wt 142.3 kg (313 lb 11.4 oz)    SpO2 100%    BMI 50.63 kg/m2     Current Shift:     Last three shifts:  08/19 1901 - 08/21 0700  In: 4666.6 [P.O.:1300;  I.V.:3366.6]  Out: 5880 [Urine:5880]    Physical Exam:  General: WD, WN. Alert, cooperative, no acute distress    HEENT: NC, Atraumatic. PERRLA, anicteric sclerae. Lungs: CTA Bilaterally. No Wheezing/Rhonchi/Rales. Heart:  Regular  rhythm,  No murmur, No Rubs, No Gallops  Abdomen: Soft, Non distended, Non tender.  +Bowel sounds,   Extremities: No c/c/e  Psych:   Not anxious or agitated. Neurologic:  No acute neurological deficit. Labs: Results:       Chemistry Recent Labs      08/21/17   0500  08/20/17   0252  08/18/17   1902   GLU  100*  119*  106*   NA  142  138  143   K  4.0  3.8  4.6   CL  111*  106  105   CO2  25  27  28   BUN  10  20*  25*   CREA  0.88  1.08  1.48*   CA  8.0*  7.8*  9.4   AGAP  6  5  10   BUCR  11*  19  17   AP  90  92  126*   TP  5.6*  5.8*  8.2   ALB  2.5*  2.6*  3.9   GLOB  3.1  3.2  4.3*   AGRAT  0.8  0.8  0.9      CBC w/Diff Recent Labs      08/21/17   0500  08/20/17   0252  08/19/17   0705   WBC  5.9  8.0  9.5   RBC  3.28*  3.37*  3.60*   HGB  10.0*  10.4*  11.1*   HCT  31.2*  32.0*  33.8*   PLT  143  136  159   GRANS  55  59  59   LYMPH  32  28  30   EOS  5  4  3      Cardiac Enzymes Recent Labs      08/20/17   1843  08/18/17   1902   CPK  25*  32   CKND1  CALCULATION NOT PERFORMED WHEN RESULT IS BELOW LINEAR LIMIT  CALCULATION NOT PERFORMED WHEN RESULT IS BELOW LINEAR LIMIT      Coagulation Recent Labs      08/21/17   0628  08/21/17   0030   08/18/17   1945   PTP   --    --    --   13.0   INR   --    --    --   1.0   APTT  76.8*  77.1*   < >  26.4    < > = values in this interval not displayed. Lipid Panel No results found for: CHOL, CHOLPOCT, CHOLX, CHLST, CHOLV, 940511, HDL, LDL, LDLC, DLDLP, 359976, VLDLC, VLDL, TGLX, TRIGL, TRIGP, TGLPOCT, CHHD, CHHDX   BNP No results for input(s): BNPP in the last 72 hours.    Liver Enzymes Recent Labs      08/21/17   0500   TP  5.6*   ALB  2.5*   AP  90   SGOT  23      Thyroid Studies Lab Results   Component Value Date/Time    TSH 0.01 08/20/2017 12:36 PM Procedures/imaging: see electronic medical records for all procedures/Xrays and details which were not copied into this note but were reviewed prior to creation of Tere Wright MD

## 2017-08-21 NOTE — INTERDISCIPLINARY ROUNDS
CRITICAL CARE INTERDISCIPLINARY ROUNDS    Patient Information:    Name:   Saima Varghese    Age:   58 y.o. Admission Date:   8/18/2017    Critical Care Day: 3    Surgery Date:      Attending Provider:   Angelica Wren DO    Surgeon:        Consultant(s):   Tammy     Critical Care Physician:  Krish Soliman - not on case    Code Status: Full Code    Problem List:     Patient Active Problem List   Diagnosis Code    Acute saddle pulmonary embolism without acute cor pulmonale ([address] I26.92    Essential hypertension, benign I10    Acquired hypothyroidism E03.9    Superficial thrombophlebitis I80.9       Principal Problem:  Acute saddle pulmonary embolism without acute cor pulmonale ([address]    During rounds the following quality care indicators and evidence based practices were addressed :  DVT Prophylaxis and PUD Prophylaxis Torres Day na (M-Care na) ; Central Line Day:na Isolation:na; Antibiotic Stewardship: na; Probiotics Necessary: na    Ventilator Bundle:      Sepsis Order Set:     Glycemic Control:   Recent Labs      08/21/17   0500  08/20/17   0252  08/18/17   1902   GLU  100*  119*  106*   ; No results for input(s): PHI, PCO2I, PO2I in the last 72 hours. No lab exists for component: 3 Adjustments     Acute MI/PCI:   Not applicable    Door to Balloon: Admission Time: 1846      Heart Failure:    Not applicable     SCIP Measures for other Surgeries:   Not applicable     Pneumonia:    Not applicable    Interdisciplinary team rounds were held with the following team membersCare Management, Nursing, Nutrition, Pastoral Care, Pharmacy, Physician and Respiratory Therapy. Plan of care discussed. Goals of Care/ Recommendations: transition to xeralto,  Stop heparin gtt. If stable may be able to transfer to [facility]    See clinical pathway and/or care plan for interventions and desired outcomes.     Critical Care Discharge Status:  Red

## 2017-08-22 LAB
ALBUMIN SERPL-MCNC: 2.6 G/DL (ref 3.4–5)
ALBUMIN/GLOB SERPL: 0.8 {RATIO} (ref 0.8–1.7)
ALP SERPL-CCNC: 88 U/L (ref 45–117)
ALT SERPL-CCNC: 30 U/L (ref 13–56)
ANA SER QL: NEGATIVE
ANION GAP SERPL CALC-SCNC: 6 MMOL/L (ref 3–18)
AST SERPL-CCNC: 18 U/L (ref 15–37)
BASOPHILS # BLD: 0 K/UL (ref 0–0.06)
BASOPHILS NFR BLD: 0 % (ref 0–2)
BILIRUB SERPL-MCNC: 0.4 MG/DL (ref 0.2–1)
BUN SERPL-MCNC: 9 MG/DL (ref 7–18)
BUN/CREAT SERPL: 11 (ref 12–20)
CALCIUM SERPL-MCNC: 8.4 MG/DL (ref 8.5–10.1)
CHLORIDE SERPL-SCNC: 109 MMOL/L (ref 100–108)
CO2 SERPL-SCNC: 25 MMOL/L (ref 21–32)
CREAT SERPL-MCNC: 0.8 MG/DL (ref 0.6–1.3)
DIFFERENTIAL METHOD BLD: ABNORMAL
EOSINOPHIL # BLD: 0.3 K/UL (ref 0–0.4)
EOSINOPHIL NFR BLD: 5 % (ref 0–5)
ERYTHROCYTE [DISTWIDTH] IN BLOOD BY AUTOMATED COUNT: 13.6 % (ref 11.6–14.5)
GLOBULIN SER CALC-MCNC: 3.3 G/DL (ref 2–4)
GLUCOSE SERPL-MCNC: 106 MG/DL (ref 74–99)
HCT VFR BLD AUTO: 30.8 % (ref 35–45)
HGB BLD-MCNC: 10 G/DL (ref 12–16)
LYMPHOCYTES # BLD: 1.7 K/UL (ref 0.9–3.6)
LYMPHOCYTES NFR BLD: 27 % (ref 21–52)
MCH RBC QN AUTO: 30.8 PG (ref 24–34)
MCHC RBC AUTO-ENTMCNC: 32.5 G/DL (ref 31–37)
MCV RBC AUTO: 94.8 FL (ref 74–97)
MONOCYTES # BLD: 0.4 K/UL (ref 0.05–1.2)
MONOCYTES NFR BLD: 6 % (ref 3–10)
NEUTS SEG # BLD: 3.8 K/UL (ref 1.8–8)
NEUTS SEG NFR BLD: 62 % (ref 40–73)
PLATELET # BLD AUTO: 137 K/UL (ref 135–420)
PMV BLD AUTO: 10 FL (ref 9.2–11.8)
POTASSIUM SERPL-SCNC: 3.9 MMOL/L (ref 3.5–5.5)
PROT SERPL-MCNC: 5.9 G/DL (ref 6.4–8.2)
RBC # BLD AUTO: 3.25 M/UL (ref 4.2–5.3)
SEE BELOW:, 164879: NORMAL
SODIUM SERPL-SCNC: 140 MMOL/L (ref 136–145)
WBC # BLD AUTO: 6.2 K/UL (ref 4.6–13.2)

## 2017-08-22 PROCEDURE — 65660000000 HC RM CCU STEPDOWN

## 2017-08-22 PROCEDURE — 74011250637 HC RX REV CODE- 250/637: Performed by: HOSPITALIST

## 2017-08-22 PROCEDURE — 74011250637 HC RX REV CODE- 250/637: Performed by: INTERNAL MEDICINE

## 2017-08-22 PROCEDURE — 80053 COMPREHEN METABOLIC PANEL: CPT | Performed by: INTERNAL MEDICINE

## 2017-08-22 PROCEDURE — 36415 COLL VENOUS BLD VENIPUNCTURE: CPT | Performed by: INTERNAL MEDICINE

## 2017-08-22 PROCEDURE — 85025 COMPLETE CBC W/AUTO DIFF WBC: CPT | Performed by: INTERNAL MEDICINE

## 2017-08-22 PROCEDURE — 74011250637 HC RX REV CODE- 250/637: Performed by: FAMILY MEDICINE

## 2017-08-22 RX ADMIN — ACETAMINOPHEN 650 MG: 325 TABLET ORAL at 10:51

## 2017-08-22 RX ADMIN — RIVAROXABAN 15 MG: 15 TABLET, FILM COATED ORAL at 17:42

## 2017-08-22 RX ADMIN — LEVOTHYROXINE SODIUM 200 MCG: 200 TABLET ORAL at 08:40

## 2017-08-22 RX ADMIN — RIVAROXABAN 15 MG: 15 TABLET, FILM COATED ORAL at 08:40

## 2017-08-22 RX ADMIN — ACETAMINOPHEN 650 MG: 325 TABLET ORAL at 23:59

## 2017-08-22 NOTE — ROUTINE PROCESS
Verbal shift change report given to A Brittanie (oncoming nurse) by Milad parker (offgoing nurse). Report included the following information SBAR, Kardex and MAR.

## 2017-08-22 NOTE — INTERDISCIPLINARY ROUNDS
CRITICAL CARE INTERDISCIPLINARY ROUNDS    Patient Information:    Name:   Cassidy Taylor    Age:   58 y.o. Admission Date:   8/18/2017    Critical Care Day: 4    Surgery Date:      Attending Provider:   Eduar Dubon DO    Surgeon:        Consultant(s):   Tammy     Critical Care Physician:  Vineet Dominguez - not on case    Code Status: Full Code    Problem List:     Patient Active Problem List   Diagnosis Code    Acute saddle pulmonary embolism without acute cor pulmonale ([address] I26.92    Essential hypertension, benign I10    Acquired hypothyroidism E03.9    Superficial thrombophlebitis I80.9       Principal Problem:  Acute saddle pulmonary embolism without acute cor pulmonale ([address]    During rounds the following quality care indicators and evidence based practices were addressed :  DVT Prophylaxis and PUD Prophylaxis Torres Day na (M-Care na) ; Central Line Day:na Isolation:na; Antibiotic Stewardship: na; Probiotics Necessary: na    Ventilator Bundle:      Sepsis Order Set:     Glycemic Control:   Recent Labs      08/22/17   0415  08/21/17   0500  08/20/17   0252   GLU  106*  100*  119*   ; No results for input(s): PHI, PCO2I, PO2I in the last 72 hours. No lab exists for component: 3 Adjustments     Acute MI/PCI:   Not applicable    Door to Balloon: Admission Time: 1846      Heart Failure:    Not applicable     SCIP Measures for other Surgeries:   Not applicable     Pneumonia:    Not applicable    Interdisciplinary team rounds were held with the following team membersCare Management, Nursing, Nutrition, , Pharmacy, Physician and Respiratory Therapy. Plan of care discussed. Goals of Care/ Recommendations: transfer to tele    See clinical pathway and/or care plan for interventions and desired outcomes.     Critical Care Discharge Status:  green

## 2017-08-22 NOTE — PROGRESS NOTES
1930- Report and care received, assessment completed per flow sheet. Alert, cooperative, NAD.    2300- Reassessment completed and without change. 0400- Reassessment completed and without change.

## 2017-08-22 NOTE — PROGRESS NOTES
Hospitalist Progress Note-critical care note     Patient: Gil Allen MRN: [id number]  CSN: [id number]    YOB: 1955  Age: 58 y.o. Sex: female    DOA: 8/18/2017 LOS:  LOS: 3 days            Chief complaint: PE.  HTN,      Assessment/Plan         Hospital Problems  Date Reviewed: 8/19/2017          Codes Class Noted POA    * (Principal)Acute saddle pulmonary embolism without acute cor pulmonale (HCC) ICD-10-CM: I26.92  ICD-9-CM: 415.13  8/19/2017 Yes        Essential hypertension, benign ICD-10-CM: I10  ICD-9-CM: 401.1  8/19/2017 Yes        Acquired hypothyroidism ICD-10-CM: E03.9  ICD-9-CM: 244.9  8/19/2017 Yes        Superficial thrombophlebitis ICD-10-CM: I80.9  ICD-9-CM: 451.9  8/19/2017 Yes            1. Acute saddle pulmonary embolism without acute cor pulmonale (HCC) (8/19/2017)  Off nc O2 during the day, desat last night,   - on xarelto and tolerate very well   - echo done   - pvl of rt leg: negative for dvt ,but superficial clots of rt LE   -saddle emboli      2. Essential hypertension, benign (8/19/2017)    - bp continue remained well and will continue hold home anti-hypertension meds    - d/c fluid         3. Acquired hypothyroidism (8/19/2017)  - on synthroid       was at the bedside. All questions answered. Will transfer to  [facility] to observe one more night, if no desat overnight and continue remain bp stable, can be d/c home     Subjective: feel better   Nurse: desat last night, now off nc O2. Review of systems:    General: No fevers or chills. Cardiovascular: No chest pain or pressure. No palpitations. Pulmonary:shortness of breath continue  better   Gastrointestinal: No nausea, vomiting.      Vital signs/Intake and Output:  Visit Vitals    /54    Pulse 75    Temp 98.6 °F (37 °C)    Resp 18    Ht 5' 6\" (1.676 m)    Wt 142.3 kg (313 lb 11.4 oz)    SpO2 98%    BMI 50.63 kg/m2     Current Shift:  08/22 0701 - 08/22 1900  In: 240 [P.O.:240]  Out: 300 [NVDND:792]  Last three shifts:  08/20 1901 - 08/22 0700  In: 2893.1 [P.O.:860; I.V.:2033.1]  Out: 1364 [Urine:5750]    Physical Exam:  General: WD, WN. Alert, cooperative, no acute distress    HEENT: NC, Atraumatic. PERRLA, anicteric sclerae. Lungs: CTA Bilaterally. No Wheezing/Rhonchi/Rales. Heart:  Regular  rhythm,  No murmur, No Rubs, No Gallops  Abdomen: Soft, Non distended, Non tender.  +Bowel sounds,   Extremities: No c/c/e  Psych:   Not anxious or agitated. Neurologic:  No acute neurological deficit. Labs: Results:       Chemistry Recent Labs      08/22/17   0415  08/21/17   0500  08/20/17   0252   GLU  106*  100*  119*   NA  140  142  138   K  3.9  4.0  3.8   CL  109*  111*  106   CO2  25  25  27   BUN  9  10  20*   CREA  0.80  0.88  1.08   CA  8.4*  8.0*  7.8*   AGAP  6  6  5   BUCR  11*  11*  19   AP  88  90  92   TP  5.9*  5.6*  5.8*   ALB  2.6*  2.5*  2.6*   GLOB  3.3  3.1  3.2   AGRAT  0.8  0.8  0.8      CBC w/Diff Recent Labs      08/22/17   0415  08/21/17   0500  08/20/17   0252   WBC  6.2  5.9  8.0   RBC  3.25*  3.28*  3.37*   HGB  10.0*  10.0*  10.4*   HCT  30.8*  31.2*  32.0*   PLT  137  143  136   GRANS  62  55  59   LYMPH  27  32  28   EOS  5  5  4      Cardiac Enzymes Recent Labs      08/20/17   1843   CPK  25*   CKND1  CALCULATION NOT PERFORMED WHEN RESULT IS BELOW LINEAR LIMIT      Coagulation Recent Labs      08/21/17   0628  08/21/17   0030   APTT  76.8*  77.1*       Lipid Panel No results found for: CHOL, CHOLPOCT, CHOLX, CHLST, CHOLV, 142571, HDL, LDL, LDLC, DLDLP, 344126, VLDLC, VLDL, TGLX, TRIGL, TRIGP, TGLPOCT, CHHD, CHHDX   BNP No results for input(s): BNPP in the last 72 hours.    Liver Enzymes Recent Labs      08/22/17   0415   TP  5.9*   ALB  2.6*   AP  88   SGOT  18      Thyroid Studies Lab Results   Component Value Date/Time    TSH 0.01 08/20/2017 12:36 PM        Procedures/imaging: see electronic medical records for all procedures/Xrays and details which were not copied into this note but were reviewed prior to creation of Robert Euceda MD

## 2017-08-23 VITALS
DIASTOLIC BLOOD PRESSURE: 63 MMHG | WEIGHT: 293 LBS | TEMPERATURE: 97.8 F | HEART RATE: 67 BPM | HEIGHT: 66 IN | RESPIRATION RATE: 18 BRPM | SYSTOLIC BLOOD PRESSURE: 133 MMHG | OXYGEN SATURATION: 99 % | BODY MASS INDEX: 47.09 KG/M2

## 2017-08-23 LAB
B2 GLYCOPROT1 IGA SER-ACNC: <9 GPI IGA UNITS (ref 0–25)
B2 GLYCOPROT1 IGG SER-ACNC: <9 GPI IGG UNITS (ref 0–20)
B2 GLYCOPROT1 IGM SER-ACNC: <9 GPI IGM UNITS (ref 0–32)
CARDIOLIPIN IGA SER IA-ACNC: <9 APL U/ML (ref 0–11)
CARDIOLIPIN IGG SER IA-ACNC: <9 GPL U/ML (ref 0–14)
CARDIOLIPIN IGM SER IA-ACNC: <9 MPL U/ML (ref 0–12)
LA PPP-IMP: NORMAL
SCREEN APTT: 36.4 SEC (ref 0–51.9)
SCREEN DRVVT: 37.4 SEC (ref 0–47)

## 2017-08-23 PROCEDURE — 74011250637 HC RX REV CODE- 250/637: Performed by: FAMILY MEDICINE

## 2017-08-23 PROCEDURE — 74011250637 HC RX REV CODE- 250/637: Performed by: HOSPITALIST

## 2017-08-23 PROCEDURE — 74011250637 HC RX REV CODE- 250/637: Performed by: INTERNAL MEDICINE

## 2017-08-23 RX ADMIN — ACETAMINOPHEN 650 MG: 325 TABLET ORAL at 08:45

## 2017-08-23 RX ADMIN — LEVOTHYROXINE SODIUM 200 MCG: 200 TABLET ORAL at 06:31

## 2017-08-23 RX ADMIN — RIVAROXABAN 15 MG: 15 TABLET, FILM COATED ORAL at 08:45

## 2017-08-23 NOTE — ROUTINE PROCESS
Bedside and Verbal shift change report given to Garett Camargo  (oncoming nurse) by Jesus Garcia RN  (offgoing nurse). Report given with SBAR, Kardex, Intake/Output and Recent Results.

## 2017-08-23 NOTE — PROGRESS NOTES
Dual AVS reviewed with Yas Chapa RN. All medications reviewed individually with patient. Opportunities for questions and concerns provided. Patient discharged via (mode of transport ie. Car, ambulance or air transport) car  Patient's arm band appropriately discarded.

## 2017-08-23 NOTE — PROGRESS NOTES
0720 Assumed care of pt from [place] [id number]. Pt resting quietly in bed , no signs of distress, call bell within reach. Shift Summary- Shift uneventful. Pt rested throughout shift. No complaints of chest pain, shortness of breath or discomfort.

## 2017-08-23 NOTE — DISCHARGE INSTRUCTIONS
DISCHARGE SUMMARY from Nurse    The following personal items are in your possession at time of discharge:    Dental Appliances: Uppers  Visual Aid: Glasses, With patient     Home Medications: None  Jewelry: Ring  Clothing: Sweater  Other Valuables: Cell Phone             PATIENT INSTRUCTIONS:    After general anesthesia or intravenous sedation, for 24 hours or while taking prescription Narcotics:  · Limit your activities  · Do not drive and operate hazardous machinery  · Do not make important personal or business decisions  · Do  not drink alcoholic beverages  · If you have not urinated within 8 hours after discharge, please contact your surgeon on call. Report the following to your surgeon:  · Excessive pain, swelling, redness or odor of or around the surgical area  · Temperature over 100.5  · Nausea and vomiting lasting longer than 4 hours or if unable to take medications  · Any signs of decreased circulation or nerve impairment to extremity: change in color, persistent  numbness, tingling, coldness or increase pain  · Any questions        What to do at Home:  Recommended activity: Activity as tolerated,      *  Please give a list of your current medications to your Primary Care Provider. *  Please update this list whenever your medications are discontinued, doses are      changed, or new medications (including over-the-counter products) are added. *  Please carry medication information at all times in case of emergency situations. These are general instructions for a healthy lifestyle:    No smoking/ No tobacco products/ Avoid exposure to second hand smoke    Surgeon General's Warning:  Quitting smoking now greatly reduces serious risk to your health.     Obesity, smoking, and sedentary lifestyle greatly increases your risk for illness    A healthy diet, regular physical exercise & weight monitoring are important for maintaining a healthy lifestyle    You may be retaining fluid if you have a history of heart failure or if you experience any of the following symptoms:  Weight gain of 3 pounds or more overnight or 5 pounds in a week, increased swelling in our hands or feet or shortness of breath while lying flat in bed. Please call your doctor as soon as you notice any of these symptoms; do not wait until your next office visit. Recognize signs and symptoms of STROKE:    F-face looks uneven    A-arms unable to move or move unevenly    S-speech slurred or non-existent    T-time-call 911 as soon as signs and symptoms begin-DO NOT go       Back to bed or wait to see if you get better-TIME IS BRAIN. Warning Signs of HEART ATTACK     Call 911 if you have these symptoms:   Chest discomfort. Most heart attacks involve discomfort in the center of the chest that lasts more than a few minutes, or that goes away and comes back. It can feel like uncomfortable pressure, squeezing, fullness, or pain.  Discomfort in other areas of the upper body. Symptoms can include pain or discomfort in one or both arms, the back, neck, jaw, or stomach.  Shortness of breath with or without chest discomfort.  Other signs may include breaking out in a cold sweat, nausea, or lightheadedness. Don't wait more than five minutes to call 911 - MINUTES MATTER! Fast action can save your life. Calling 911 is almost always the fastest way to get lifesaving treatment. Emergency Medical Services staff can begin treatment when they arrive -- up to an hour sooner than if someone gets to the hospital by car. The discharge information has been reviewed with the patient and spouse. The patient and spouse verbalized understanding. Discharge medications reviewed with the patient and spouse and appropriate educational materials and side effects teaching were provided.     Patient armband removed and shredded

## 2017-08-23 NOTE — PROGRESS NOTES
[id number] Shift Summary: Pt rested well overnight with no complaints. No new clinical concerns noted.

## 2017-08-23 NOTE — DISCHARGE SUMMARY
Discharge Summary    Patient: Irma Estrella MRN: [id number]  CSN: [id number]    YOB: 1955  Age: 58 y.o. Sex: female    DOA: 8/18/2017 LOS:  LOS: 4 days   Discharge Date:      Primary Care Provider:  Margarito Angelo MD    Admission Diagnoses: Pulmonary emboli [facility])  Pulmonary embolism [facility])    Discharge Diagnoses:    Hospital Problems  Date Reviewed: 8/19/2017          Codes Class Noted POA    * (Principal)Acute saddle pulmonary embolism without acute cor pulmonale ([facility] [address] ICD-10-CM: I26.92  ICD-9-CM: 415.13  8/19/2017 Yes        Essential hypertension, benign ICD-10-CM: I10  ICD-9-CM: 401.1  8/19/2017 Yes        Acquired hypothyroidism ICD-10-CM: E03.9  ICD-9-CM: 244.9  8/19/2017 Yes        Superficial thrombophlebitis ICD-10-CM: I80.9  ICD-9-CM: 451.9  8/19/2017 Yes              Discharge Condition: Stable    Discharge Medications:     Current Discharge Medication List      START taking these medications    Details   rivaroxaban (XARELTO) 15 mg (42)- 20 mg (9) DsPk Take one 15 mg tablet twice a day with food for the first 21 days. Then, take one 20 mg tablet once a day with food for 9 days. Qty: 1 Dose Pack, Refills: 0         CONTINUE these medications which have NOT CHANGED    Details   levothyroxine (SYNTHROID) 200 mcg tablet Take 200 mcg by mouth Daily (before breakfast). furosemide (LASIX) 20 mg tablet Take 20 mg by mouth daily. lisinopril (PRINIVIL, ZESTRIL) 40 mg tablet Take 40 mg by mouth daily.          STOP taking these medications       trimethoprim-sulfamethoxazole (BACTRIM DS) 160-800 mg per tablet Comments:   Reason for Stopping:         ibuprofen (MOTRIN) 200 mg tablet Comments:   Reason for Stopping:               Procedures : none     Consults: Cardiology      PHYSICAL EXAM   Visit Vitals    /74    Pulse 71    Temp 97.9 °F (36.6 °C)    Resp 18    Ht 5' 6\" (1.676 m)    Wt 136.1 kg (300 lb 0.7 oz)    SpO2 96%    BMI 48.43 kg/m2     General: Awake, cooperative, no acute distress    HEENT: NC, Atraumatic. PERRLA, EOMI. Anicteric sclerae. Lungs:  CTA Bilaterally. No Wheezing/Rhonchi/Rales. Heart:  Regular  rhythm,  No murmur, No Rubs, No Gallops  Abdomen: Soft, Non distended, Non tender. +Bowel sounds,   Extremities: No c/c/e  Psych:   Not anxious or agitated. Neurologic:  No acute neurological deficits. Admission HPI :   Gil Allen is a 58 y.o. female with past medical history significant for HTN, hypothyroidism presents to the ER with acute onset of shortness of breath. She had noted pain in her leg which started about 1 week ago. She was seen by Dr Jose Ramon Norris and treated for dermatitis with no improvement in her symptoms. I saw her in the office yesterday and she was noted to have superficial venous thrombosis and supportive treatment started but she had progressive pain in her legs and SMITH. She states that her dyspnea is associated w chest tightness but no pain, dyspnea at rest, fever, chills, cough. SHe has no history of vte in past but does have a lengthy commute to [place] daily.     On presentation to the ER she was tachycardic in 120s, normotensive with out hypoxia. Exam pertinent for tachycardia, clear lungs andtenderness/ erythema to right lower extremity. Cr minimally elevated. CTA with bilateral pulmonary embolism with thin saddle component. Medicine is asked to admit for further management. Hospital Course : For acute saddle pulmonary embolism without acute cor pulmonale, iv heparin gtt was  started. She was put on nc  O2 and anti-hypertensive medication was hold due to low bp. Echo was performed. Cardiology was on board. pvl no dvt indicated. With the treatment, her hypotension resolved and xarelto was started. She is off nc o2 and no desat /sob/chest pain on discharge. She need to one hr from home to work, recommend to f/u with her pcm on Monday and do not go back work till [facility] clearance.  Also recommended to restart lisinopril and lasix gradually and check bp with pcm . She and her  indicated a verbal understanding. Also recommend to f/u hematology for hypercoagulability  work up.   Kemi Cardenas    Activity: light duty     Diet: Regular Diet    Follow-up: pcm and hematology     Disposition: home     Minutes spent on discharge: 45 min       Labs: Results:       Chemistry Recent Labs      08/22/17   0415  08/21/17   0500   GLU  106*  100*   NA  140  142   K  3.9  4.0   CL  109*  111*   CO2  25  25   BUN  9  10   CREA  0.80  0.88   CA  8.4*  8.0*   AGAP  6  6   BUCR  11*  11*   AP  88  90   TP  5.9*  5.6*   ALB  2.6*  2.5*   GLOB  3.3  3.1   AGRAT  0.8  0.8      CBC w/Diff Recent Labs      08/22/17   0415  08/21/17   0500   WBC  6.2  5.9   RBC  3.25*  3.28*   HGB  10.0*  10.0*   HCT  30.8*  31.2*   PLT  137  143   GRANS  62  55   LYMPH  27  32   EOS  5  5      Cardiac Enzymes Recent Labs      08/20/17   1843   CPK  25*   CKND1  CALCULATION NOT PERFORMED WHEN RESULT IS BELOW LINEAR LIMIT      Coagulation Recent Labs      08/21/17   0628  08/21/17   0030   APTT  76.8*  77.1*       Lipid Panel No results found for: CHOL, CHOLPOCT, CHOLX, CHLST, CHOLV, 667921, HDL, LDL, LDLC, DLDLP, 834887, VLDLC, VLDL, TGLX, TRIGL, TRIGP, TGLPOCT, CHHD, CHHDX   BNP No results for input(s): BNPP in the last 72 hours. Liver Enzymes Recent Labs      08/22/17   0415   TP  5.9*   ALB  2.6*   AP  88   SGOT  18      Thyroid Studies Lab Results   Component Value Date/Time    TSH 0.01 08/20/2017 12:36 PM            Significant Diagnostic Studies: Xr Chest Pa Lat    Result Date: 8/19/2017  EXAM: PA and lateral views of the chest. INDICATION: Shortness of breath COMPARISON: None FINDINGS: No focal consolidation, effusion, or pneumothorax. Heart size normal. Thoracolumbar scoliosis noted. IMPRESSION: 1. No acute cardiopulmonary process.     Cta Chest W Or W Wo Cont    Result Date: 8/19/2017  EXAM: CTA chest INDICATION: Shortness of breath. Chest pain. COMPARISON: None TECHNIQUE: Axial CT imaging from the thoracic inlet through the diaphragm with intravenous contrast. Coronal and sagittal MIP reformats were generated. Dose reduction techniques used: automated exposure control, adjustment of the mAs and/or kVp according to patient size, and iterative reconstruction techniques. _______________ FINDINGS: EXAM QUALITY: Excellent PULMONARY ARTERIES: There is a thin filling defect extending across the pulmonary arteries with filling defects also extending into the right lower lobe as well as right upper lobe and minimally into the MEDIASTINUM: Normal heart size. No evidence of right heart strain. Aorta is unremarkable. No pericardial effusion. LUNGS: There is a 7 mm right upper lobe pulmonary nodule which appears to contain fat. PLEURA: Normal. AIRWAY: Normal. LYMPH NODES: No enlarged nodes. UPPER ABDOMEN: The spleen is enlarged measuring up to 16 cm. OTHER: There is a 2.5 cm T9 hemangioma. _______________     IMPRESSION: Bilateral pulmonary embolism with a thin saddle component. Splenomegaly. 7 mm right upper lobe pulmonary nodule which appears to contain fat and possibly an adenoma. Consider a 6 month follow-up. Findings called to emergency department prior to signing report.              [facility]     CC: Gloria Ibarra MD

## 2017-08-23 NOTE — PROGRESS NOTES
D/c plan home today    Met with patient and  at bedside. Patient states she is being d/c home today. Patient will have follow up in AVS for pcp, and Cardiology    Patient denies other needs states he  is very helpful. Understands of follow up with pcp an other speciality doctors. Care Management Interventions  PCP Verified by CM:  Yes  Transition of Care Consult (CM Consult): Discharge Planning  Current Support Network: Lives with Spouse  Confirm Follow Up Transport: Family  Plan discussed with Pt/Family/Caregiver: Yes  Freedom of Choice Offered: Yes  Discharge Location  Discharge Placement: Home with family assistance

## 2017-10-14 ENCOUNTER — HOSPITAL ENCOUNTER (OUTPATIENT)
Dept: CT IMAGING | Age: 62
Discharge: HOME | End: 2017-10-14
Attending: INTERNAL MEDICINE
Payer: COMMERCIAL

## 2017-10-14 DIAGNOSIS — R16.1 SPLENOMEGALY: ICD-10-CM

## 2017-10-14 DIAGNOSIS — C64.1 MALIGNANT NEOPLASM OF RIGHT KIDNEY, EXCEPT RENAL PELVIS (HCC): ICD-10-CM

## 2017-10-14 PROCEDURE — 74176 CT ABD & PELVIS W/O CONTRAST: CPT

## 2017-10-31 ENCOUNTER — HOSPITAL ENCOUNTER (OUTPATIENT)
Dept: ULTRASOUND IMAGING | Age: 62
Discharge: HOME | End: 2017-10-31
Attending: INTERNAL MEDICINE
Payer: COMMERCIAL

## 2017-10-31 DIAGNOSIS — N83.202 BILATERAL OVARIAN CYSTS: ICD-10-CM

## 2017-10-31 DIAGNOSIS — N83.201 BILATERAL OVARIAN CYSTS: ICD-10-CM

## 2017-10-31 PROCEDURE — 76830 TRANSVAGINAL US NON-OB: CPT

## 2017-11-07 ENCOUNTER — OFFICE VISIT (OUTPATIENT)
Dept: ONCOLOGY | Age: 62
End: 2017-11-07

## 2017-11-07 VITALS
HEART RATE: 77 BPM | TEMPERATURE: 98 F | WEIGHT: 293 LBS | OXYGEN SATURATION: 98 % | HEIGHT: 66 IN | SYSTOLIC BLOOD PRESSURE: 123 MMHG | BODY MASS INDEX: 47.09 KG/M2 | DIASTOLIC BLOOD PRESSURE: 64 MMHG

## 2017-11-07 DIAGNOSIS — N83.209 CYST OF OVARY, UNSPECIFIED LATERALITY: Primary | ICD-10-CM

## 2017-11-07 NOTE — PROGRESS NOTES
Kory 58, a 58 y.o. female,  is here for   Chief Complaint   Patient presents with    Referral Follow Up     Dr. Kevin Chen; ovarian cyst       Visit Vitals    /64 (BP 1 Location: Right arm, BP Patient Position: Sitting)    Pulse 77    Temp 98 °F (36.7 °C) (Oral)    Ht 5' 5.5\" (1.664 m)    Wt 138.4 kg (305 lb 3.2 oz)    SpO2 98%    BMI 50.02 kg/m2       Patient denies any abdominal or pelvic pain. No unusual bleeding, discharge, or irritation. No changes in bladder or bowel habits. 1. Have you been to the ER, urgent care clinic since your last visit? Hospitalized since your last visit? No    2. Have you seen or consulted any other health care providers outside of the [address] [place] [address] since your last visit? Include any pap smears or colon screening.  new patient

## 2017-11-07 NOTE — PROGRESS NOTES
[address], [address], [address] [facility]  [address] [facility], [address] [place] [address]  [place], [address]   [phone]  Tee Wright DO      Patient ID:  Name:  Yaneth Mcleod  MRN:  [id number]  :  1955/62 y.o. Date:  2017      HISTORY OF PRESENT ILLNESS:  Yaneth Mcleod is a 58 y.o.  postmenopausal female referred by Dr. Mauri Kawasaki for right ovarian cyst.  CT C/A/P initially performed for right renal cell cancer surveillance revealed ovarian lesion, for which TVUS was performed. TVUS shows right ovarian cyst measuring 6.9 x 4.3 x 4.8 cm, which demonstrates a thin peripheral septation. Patient referred for further evaluation and treatment of findings. Today patient without complaints. Pt states she has known about cyst since her renal cancer diagnosis in [id number]. Denies Vaginal bleeding, change in vaginal discharge, new abdominopelvic pain, change in bladder/bowel habits    Pt with recent history of PE    Labs:  none      Imaging  TVUS 10/31/17  Indication: Right ovarian cyst on CT     Comparison:  CT chest, abdomen and pelvis from 10/14/17     Findings: A real time sonographic examination of the pelvis was performed. Both  transabdominal and endovaginal images were obtained. The uterus has been  previously removed. The right ovary is identified. The right ovary measured  7.2 x 4.8 x 5.3 cm. No adnexal masses or fluid collections were present. A  right ovarian cyst is present measuring 6.9 x 4.3 x 4.8 cm. This cyst  demonstrates a thin peripheral septation.      IMPRESSION  IMPRESSION:     Status post hysterectomy     Left ovary not identified     Right ovarian cyst present with size measurements as given. 1 year follow-up is  Recommended.     CT C/A/P 10/14/17  CT CHEST, ABDOMEN, PELVIS WITHOUT CONTRAST.     History: History of right renal cell cancer follow-up     CT technique:   Serial axial noncontrast CT performed from lung apex to posterior sulcus for  chest, liver dome to crest for abdomen, and from crest to pubis for pelvis. Only  oral contrast was administered, but no iodine containing nonionic IV was  administered per referring clinician request.      One or more dose reduction techniques were used on this CT: automated exposure  control, adjustment of the mAs and/or kVp according to patient's size, and  iterative reconstruction techniques. The specific techniques utilized on this CT  exam have been documented in the patient's electronic medical record.     No prior relevant abdominal exams. No prior CT abdomen pelvis. Comparison  prior exam,Prior CTPA chest, 8/18/2017        CT findings:     CT chest     Improved level of lung inflation with resolved poorly defined hazy partial  atelectasis. Stable round 0.8 cm noncalcified pulmonary nodule, anterior segment  right upper lobe, when reviewed with thin section raw data, indeterminate, of  soft tissue density. Stable curvilinear pleural-parenchymal scar, inferior  lingula.        Equivocal hypodensity, distal right interlobar pulmonary artery perhaps in  hardening or motion related. Vascular assessment very limited with noncontrast  CT     Otherwise unremarkable mediastinum. Minimal aortic atherosclerosis but normal  caliber great vessels. No evident pericardial effusion nor mediastinal  lymphadenopathy.     No pleural effusion, pleural base mass or pneumothorax     CT abdomen     There is been simple right nephrectomy with chain suture and portions of the  right colon filling in the renal fossa but no discrete residual mass. Right  adrenal appears unremarkable. 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     No perihepatic ascites or pneumoperitoneum. Prior cholecystectomy.  Top normal  residual central extrahepatic biliary tree.     Possible mild lobular wall thickening at the gastric cardia distal esophageal  level, indeterminate. Moderate sigmoid colonic diverticulosis with mild sawtooth  like mural wall thickening but no mesocolic soft tissue stranding.     CT pelvis     Prior laparotomy and hysterectomy. Ovoid 6.5 x 5.0 cm right ovarian lesion  measuring 30 Hounsfield units greater than simple fluid but without gross  calcifications. Unremarkable left ovary. No free pelvic fluid or pelvic  lymphadenopathy     Decompressed urinary bladder appearing otherwise unremarkable for limited degree  of distention. Scattered phleboliths.     Multilevel thoracolumbar degenerative disc disease and spondylosis with mild  levoconvex scoliosis. Greatest and associated with mild to moderate central  stenosis, L3-4. Multilevel facet joint osteoarthritis. Scattered fat-containing  hemangiomata T9 and L2 vertebral bodies. Body habitus     The axial pelvic skeleton by CT appears otherwise unremarkable with or without  several scattered bone islands.        IMPRESSION  IMPRESSION:        1. Noncontrast exam which may limit detection of metastatic disease. Prior right  simple nephrectomy. No evident recurrent/residual right renal fossa mass.        2. Stable indeterminate 0.8 cm pulmonary nodule anterior segment right upper  lobe; may be benign or small primary or secondary malignancy. Lesion is likely  too small to evaluate by PET CT and may be difficult to access percutaneously  for biopsy. No gross change over the very short 2 month interval.   Further follow-up per routine oncology imaging protocol is sufficient.        2. Oval 6 cm right ovarian lesion, of greater than simple fluid density. No  prior exam. May represent hemorrhagic or hyperdense cystic lesion (e.g.  endometrioma, low-grade cystic neoplasm) or solid lesion.  Pelvic ultrasound may  be useful for current characterization and follow-up.     3. Mild stable lobular apparent wall thickening, gastric cardia region. May be a  function of incomplete distention, local inflammation or less likely early  neoplasia. Barium esophagography should be considered.     4. Cholecystectomy. Hysterectomy. Colonic diverticulosis. Multiple osseous,  including primarily degenerative spinal findings are present, as discussed  above. ROS:   As above      Patient Active Problem List    Diagnosis Date Noted    Acute saddle pulmonary embolism without acute cor pulmonale ([address] 2017    Essential hypertension, benign 2017    Acquired hypothyroidism 2017    Superficial thrombophlebitis 2017     Past Medical History:   Diagnosis Date    Arthritis     Cancer ([address]     kidney right    Hypertension     Morbid obesity ([address]     PE (pulmonary thromboembolism) ([address]     Thyroid disease     hypothyroidism      Past Surgical History:   Procedure Laterality Date    HX COLONOSCOPY      every 3 years due to polyps    HX GI      jeannie    HX HYSTERECTOMY      HX OOPHORECTOMY      HX ORTHOPAEDIC      left ankle fx repair    HX UROLOGICAL      right kidney removed      OB History      Para Term  AB Living    3 2 2  1 2    SAB TAB Ectopic Molar Multiple Live Births         2        Social History   Substance Use Topics    Smoking status: Never Smoker    Smokeless tobacco: Never Used    Alcohol use No      Family History   Problem Relation Age of Onset    Hypertension Mother     Hypertension Father     COPD Father       Current Outpatient Prescriptions   Medication Sig    rivaroxaban (XARELTO) 15 mg (42)- 20 mg (9) DsPk Take one 15 mg tablet twice a day with food for the first 21 days. Then, take one 20 mg tablet once a day with food for 9 days.  levothyroxine (SYNTHROID) 200 mcg tablet Take 200 mcg by mouth Daily (before breakfast).  furosemide (LASIX) 20 mg tablet Take 20 mg by mouth daily.     lisinopril (PRINIVIL, ZESTRIL) 40 mg tablet Take 40 mg by mouth daily. No current facility-administered medications for this visit. No Known Allergies       OBJECTIVE:    Physical Exam  VITAL SIGNS: Visit Vitals    /64 (BP 1 Location: Right arm, BP Patient Position: Sitting)    Pulse 77    Temp 98 °F (36.7 °C) (Oral)    Ht 5' 5.5\" (1.664 m)    Wt 138.4 kg (305 lb 3.2 oz)    LMP  (LMP Unknown)    SpO2 98%    BMI 50.02 kg/m2      GENERAL CAROLE: in no apparent distress and well developed and well nourished   MUSCULOSKEL: no joint tenderness, deformity or swelling   INTEGUMENT:  warm and dry, no rashes or lesions   ABDOMEN . soft, NT, ND, No masses appreciated   EXTREMITIES: extremities normal, atraumatic, no cyanosis or edema   PELVIC: External genitalia: normal general appearance  Vaginal: atrophic mucosa  Cervix: removed surgically  Adnexa: limited secondary to body habitus  Uterus: removed surgically   RECTAL: deferred   CARL SURVEY: Cervical, supraclavicular, axillary and inguinal nodes normal.   NEURO: Grossly normal         IMPRESSION/PLAN:  1. Ovarian cyst   -we reviewed most recent imaging and explained very benign appearance   -after looking back at her imaging it appears this lesion was there in 2009 and has even got a little smaller since then   -given these findings the chance of cancer in ovary is very low   -we also discussed given she is asymptomatic and has recent diagnosis of PE that surgical evaluation would not be recommended now   -if patient became symptomatic would be happy to see back to discuss surgery   -all of ms. Onofre's questions/concerns addressed      The total time spent was 60 minutes regarding this patients diagnosis of ovarian cyst and >50% of this time was spent counseling and coordinating care    [address] [place] [address]  Gynecologic Oncology  11/7/20179:57 AM

## 2017-11-07 NOTE — MR AVS SNAPSHOT
Visit Information Date & Time Provider Department Dept. Phone Encounter #  
 11/7/2017 10:00 AM MD Aniceto Michel [facility] [phone] [id number] Upcoming Health Maintenance Date Due Hepatitis C Screening 1955 Pneumococcal 19-64 Highest Risk (1 of 3 - PCV13) 1/26/1974 DTaP/Tdap/Td series (1 - Tdap) 1/26/1976 PAP AKA CERVICAL CYTOLOGY 1/26/1976 BREAST CANCER SCRN MAMMOGRAM 1/26/2005 FOBT Q 1 YEAR AGE 50-75 1/26/2005 ZOSTER VACCINE AGE 60> 11/26/2014 INFLUENZA AGE 9 TO ADULT 8/1/2017 Allergies as of 11/7/2017  Review Complete On: 11/7/2017 By: Sumit Potter MD  
 No Known Allergies Current Immunizations  Reviewed on 8/21/2017 No immunizations on file. Not reviewed this visit You Were Diagnosed With   
  
 Codes Comments Malignant neoplasm of exocervix ([address]    -  Primary ICD-10-CM: C53.1 ICD-9-CM: 180.1 Vitals BP Pulse Temp Height(growth percentile) Weight(growth percentile) LMP  
 123/64 (BP 1 Location: Right arm, BP Patient Position: Sitting) 77 98 °F (36.7 °C) (Oral) 5' 5.5\" (1.664 m) 305 lb 3.2 oz (138.4 kg) (LMP Unknown) SpO2 BMI OB Status Smoking Status 98% 50.02 kg/m2 Hysterectomy Never Smoker BMI and BSA Data Body Mass Index Body Surface Area 50.02 kg/m 2 2.53 m 2 Your Updated Medication List  
  
   
This list is accurate as of: 11/7/17 11:07 AM.  Always use your most recent med list.  
  
  
  
  
 furosemide 20 mg tablet Commonly known as:  LASIX Take 20 mg by mouth daily. levothyroxine 200 mcg tablet Commonly known as:  SYNTHROID Take 200 mcg by mouth Daily (before breakfast). lisinopril 40 mg tablet Commonly known as:  Mily Loss Take 40 mg by mouth daily. rivaroxaban 15 mg (42)- 20 mg (9) Dspk Commonly known as:  Christo Stein Take one 15 mg tablet twice a day with food for the first 21 days.  Then, take one 20 mg tablet once a day with food for 9 days. To-Do List   
 11/17/2017 Imaging:  MRI PELV W WO CONT   
  
 11/17/2017 Imaging:  PET/CT TUMOR IMAGE SKULL THIGH W (INI) Introducing [facility]! Edith Pink introduces [employer] patient portal. Now you can access parts of your medical record, email your doctor's office, and request medication refills online. 1. In your internet browser, go to [email or web address] 2. Click on the First Time User? Click Here link in the Sign In box. You will see the New Member Sign Up page. 3. Enter your [employer] Access Code exactly as it appears below. You will not need to use this code after youve completed the sign-up process. If you do not sign up before the expiration date, you must request a new code. · [employer] Access Code: [email or web address] Expires: 11/15/2017  1:09 PM 
 
4. Enter the last four digits of your Social Security Number (xxxx) and Date of Birth (mm/dd/yyyy) as indicated and click Submit. You will be taken to the next sign-up page. 5. Create a [employer] ID. This will be your [employer] login ID and cannot be changed, so think of one that is secure and easy to remember. 6. Create a [employer] password. You can change your password at any time. 7. Enter your Password Reset Question and Answer. This can be used at a later time if you forget your password. 8. Enter your e-mail address. You will receive e-mail notification when new information is available in [address] 9. Click Sign Up. You can now view and download portions of your medical record. 10. Click the Download Summary menu link to download a portable copy of your medical information. If you have questions, please visit the Frequently Asked Questions section of the [employer] website. Remember, [employer] is NOT to be used for urgent needs. For medical emergencies, dial 911. Now available from your iPhone and Android! Please provide this summary of care documentation to your next provider. Your primary care clinician is listed as [facility]. If you have any questions after today's visit, please call [phone].

## 2018-02-01 ENCOUNTER — HOSPITAL ENCOUNTER (OUTPATIENT)
Dept: GENERAL RADIOLOGY | Age: 63
Discharge: HOME | End: 2018-02-01
Attending: INTERNAL MEDICINE
Payer: COMMERCIAL

## 2018-02-01 DIAGNOSIS — R93.5 ABNORMAL ABDOMINAL ULTRASOUND: ICD-10-CM

## 2018-02-01 DIAGNOSIS — R13.10 DIFFICULTY IN SWALLOWING: ICD-10-CM

## 2018-02-01 PROCEDURE — 74220 X-RAY XM ESOPHAGUS 1CNTRST: CPT

## 2018-02-01 PROCEDURE — 74011000255 HC RX REV CODE- 255: Performed by: INTERNAL MEDICINE

## 2018-02-01 PROCEDURE — 74011000250 HC RX REV CODE- 250: Performed by: INTERNAL MEDICINE

## 2018-02-01 RX ADMIN — BARIUM SULFATE 135 ML: 980 POWDER, FOR SUSPENSION ORAL at 07:50

## 2018-02-01 RX ADMIN — BARIUM SULFATE 176 G: 960 POWDER, FOR SUSPENSION ORAL at 07:50

## 2018-02-01 RX ADMIN — ANTACID/ANTIFLATULENT 4 G: 380; 550; 10; 10 GRANULE, EFFERVESCENT ORAL at 07:50

## 2018-03-13 RX ORDER — ATROPINE SULFATE 0.1 MG/ML
0.5 INJECTION INTRAVENOUS
Status: CANCELLED | OUTPATIENT
Start: 2018-03-13 | End: 2018-03-14

## 2018-03-13 RX ORDER — EPINEPHRINE 0.1 MG/ML
1 INJECTION INTRACARDIAC; INTRAVENOUS
Status: CANCELLED | OUTPATIENT
Start: 2018-03-13 | End: 2018-03-14

## 2018-03-13 RX ORDER — DEXTROMETHORPHAN/PSEUDOEPHED 2.5-7.5/.8
1.2 DROPS ORAL
Status: CANCELLED | OUTPATIENT
Start: 2018-03-13

## 2018-03-14 ENCOUNTER — HOSPITAL ENCOUNTER (OUTPATIENT)
Age: 63
Setting detail: OUTPATIENT SURGERY
Discharge: HOME | End: 2018-03-14
Attending: INTERNAL MEDICINE | Admitting: INTERNAL MEDICINE
Payer: COMMERCIAL

## 2018-03-14 VITALS
HEIGHT: 66 IN | DIASTOLIC BLOOD PRESSURE: 75 MMHG | WEIGHT: 293 LBS | OXYGEN SATURATION: 100 % | BODY MASS INDEX: 47.09 KG/M2 | HEART RATE: 81 BPM | RESPIRATION RATE: 18 BRPM | SYSTOLIC BLOOD PRESSURE: 146 MMHG | TEMPERATURE: 97.9 F

## 2018-03-14 PROCEDURE — 77030011640 HC PAD GRND REM COVD -A: Performed by: INTERNAL MEDICINE

## 2018-03-14 PROCEDURE — 77010033678 HC OXYGEN DAILY

## 2018-03-14 PROCEDURE — G0500 MOD SEDAT ENDO SERVICE >5YRS: HCPCS | Performed by: INTERNAL MEDICINE

## 2018-03-14 PROCEDURE — 76040000007: Performed by: INTERNAL MEDICINE

## 2018-03-14 PROCEDURE — 74011250636 HC RX REV CODE- 250/636: Performed by: INTERNAL MEDICINE

## 2018-03-14 PROCEDURE — 74011250636 HC RX REV CODE- 250/636

## 2018-03-14 PROCEDURE — 74011000250 HC RX REV CODE- 250: Performed by: INTERNAL MEDICINE

## 2018-03-14 RX ORDER — MIDAZOLAM HYDROCHLORIDE 1 MG/ML
.5-5 INJECTION, SOLUTION INTRAMUSCULAR; INTRAVENOUS
Status: DISCONTINUED | OUTPATIENT
Start: 2018-03-14 | End: 2018-03-14 | Stop reason: HOSPADM

## 2018-03-14 RX ORDER — ACETAMINOPHEN 500 MG
1000 TABLET ORAL
COMMUNITY

## 2018-03-14 RX ORDER — SODIUM CHLORIDE 9 MG/ML
100 INJECTION, SOLUTION INTRAVENOUS CONTINUOUS
Status: DISPENSED | OUTPATIENT
Start: 2018-03-14 | End: 2018-03-14

## 2018-03-14 RX ORDER — FENTANYL CITRATE 50 UG/ML
100 INJECTION, SOLUTION INTRAMUSCULAR; INTRAVENOUS
Status: DISCONTINUED | OUTPATIENT
Start: 2018-03-14 | End: 2018-03-14 | Stop reason: HOSPADM

## 2018-03-14 RX ORDER — NALOXONE HYDROCHLORIDE 0.4 MG/ML
0.4 INJECTION, SOLUTION INTRAMUSCULAR; INTRAVENOUS; SUBCUTANEOUS
Status: DISCONTINUED | OUTPATIENT
Start: 2018-03-14 | End: 2018-03-14 | Stop reason: HOSPADM

## 2018-03-14 RX ORDER — DILTIAZEM HYDROCHLORIDE EXTENDED-RELEASE TABLETS 240 MG/1
240 TABLET, EXTENDED RELEASE ORAL DAILY
COMMUNITY
End: 2019-02-13

## 2018-03-14 RX ORDER — FLUMAZENIL 0.1 MG/ML
0.2 INJECTION INTRAVENOUS
Status: DISCONTINUED | OUTPATIENT
Start: 2018-03-14 | End: 2018-03-14 | Stop reason: HOSPADM

## 2018-03-14 RX ADMIN — SODIUM CHLORIDE 100 ML/HR: 900 INJECTION, SOLUTION INTRAVENOUS at 12:03

## 2018-03-14 NOTE — PROCEDURES
[facility]  Esophagogastroduodenoscopy Procedure Report  _______________________________________________________  Patient: Noelle Ibrahim                                         Attending Physician: Noe Huizar MD    Patient ID: [id number]                                      Referring Physician: Noy Nowak MD    Exam Date: March 14, 2018 _______________________________________________________      Introduction: A  61 y.o. female patient, presents for Esophagogastroduodenoscopy Procedure. Indication: while investigating the reason for massive pulmonary embolism that happened in August 2017. she was found to have on non contrast Ct scan of the abdomen and chest 10/14/ 2017  thickening of the cardia but on barium swallow 2/1/ 2018  showed wall thickening of the distal esophagus and dysmotility but strangely the patient is completely asymptomatic from GI point of view. : Noe Huizar MD    Sedation:    Versed 6 mg IV, fentanyl 100 mcg IV, topical Hurricaine spray    Procedure Details:  After infomed consent was obtained for the procedure, with all risks and benefits of procedure explained the patient was taken to the endoscopy suite and placed in the left lateral decubitus position. Following sequential administration of sedation as per above, the endoscope was inserted into the mouth and advanced under direct vision to third portion of the duodenum. A careful inspection was made as the gastroscope was withdrawn, including a retroflexed view of the proximal stomach; findings and interventions are described below. Findings:   HYPOPHARYNX AND LARYNX: Normal  Esophagus: Multiple localized varicose veins in the proximal esophagus at 25 cm. The very distal esophagus appears to be more spastic and slightly stenotic without any visible esophagitis. Z line discretely irregular but no Wilkinson's. There is a > 2 cm sliding hiatal hernia. Normal middle esophagus.  Diaphragmatic pinch located at 40 cm. Stomach: No food or liquid retention. Normal, cardia, fundus, body, lesser curvature, incisura, antrum and pylorus. Mucosa is normal.    Duodenum/jejunum: The bulb, third, fourth portions and major papilla are normal medium sized periampulary diverticulum    Therapies:    none    Specimen:   none           Complications:   None    EBL:  None  IMPRESSION: Multiple localized varicose veins in the proximal esophagus at 25 cm. The very distal esophagus appears to be more spastic and slightly stenotic without any visible esophagitis. Z line discretely irregular but no Wilkinson's. There is a > 2 cm sliding hiatal hernia. Medium sized periampulary diverticulum. No gastric or distal esophageal varices. Recommendations: -Acid suppression as needed with OTC medication or even a proton pump inhibitor like Omeprazole 20 mg if symptoms of heartburn. , -GERD diet: avoid fried and fatty foods. peppermint, chocolate, alcohol, coffee, citrus fruits and juices, tomoato products; avoid lying down for 2 to 3 hours after eating., -No NSAIDS, -repeat CT scan of the chest with contrast to check and r/o a lesion affecting the Azygoss vein? ?    Assistant: none    Lexi Fletcher MD  3/14/2018  2:19 PM

## 2018-03-14 NOTE — DISCHARGE INSTRUCTIONS
Sailaja Mccollum  [id number]  1955    EGD DISCHARGE INSTRUCTIONS  Discomfort:  Sore throat- throat lozenges or warm salt water gargle  redness at IV site- apply warm compress to area; if redness or soreness persist- contact your physician  Gaseous discomfort- walking, belching will help relieve any discomfort  You may not operate a vehicle until the next day  You may not engage in an occupation involving machinery or appliances until the next day  You may not drink alcoholic beverages until the next day  Avoid making any critical decisions for at least 24 hour    DIET   You may not resume your regular diet. Antireflux diet. ACTIVITY  You may not resume your normal daily activities   Spend the remainder of the day resting -  avoid any strenuous activity. CALL M.D. ANY SIGN OF   Increasing pain, nausea, vomiting  Abdominal distension (swelling)  New increased bleeding (oral or rectal)  Fever (chills)  Pain in chest area  Bloody discharge from nose or mouth  Shortness of breath     You may take any Advil, Aspirin, Ibuprofen, Motrin, Aleve, or Goodys but preferably Tylenol as needed for pain. Follow-up Instructions: Follow-up in the office as scheduled or make a follow-up appointment in 2 weeks after the CT of the chest.     Shae Saleh MD  March 14, 2018    . DISCHARGE SUMMARY from Nurse    PATIENT INSTRUCTIONS:    After general anesthesia or intravenous sedation, for 24 hours or while taking prescription Narcotics:  · Limit your activities  · Do not drive and operate hazardous machinery  · Do not make important personal or business decisions  · Do  not drink alcoholic beverages  · If you have not urinated within 8 hours after discharge, please contact your surgeon on call.     Report the following to your surgeon:  · Excessive pain, swelling, redness or odor of or around the surgical area  · Temperature over 100.5  · Nausea and vomiting lasting longer than 4 hours or if unable to take medications  · Any signs of decreased circulation or nerve impairment to extremity: change in color, persistent  numbness, tingling, coldness or increase pain  · Any questions    What to do at Home:  Recommended activity: Activity as tolerated and no driving for today,     If you experience any of the following symptoms as above, please follow up with . *  Please give a list of your current medications to your Primary Care Provider. *  Please update this list whenever your medications are discontinued, doses are      changed, or new medications (including over-the-counter products) are added. *  Please carry medication information at all times in case of emergency situations. These are general instructions for a healthy lifestyle:    No smoking/ No tobacco products/ Avoid exposure to second hand smoke  Surgeon General's Warning:  Quitting smoking now greatly reduces serious risk to your health. Obesity, smoking, and sedentary lifestyle greatly increases your risk for illness    A healthy diet, regular physical exercise & weight monitoring are important for maintaining a healthy lifestyle    You may be retaining fluid if you have a history of heart failure or if you experience any of the following symptoms:  Weight gain of 3 pounds or more overnight or 5 pounds in a week, increased swelling in our hands or feet or shortness of breath while lying flat in bed. Please call your doctor as soon as you notice any of these symptoms; do not wait until your next office visit. Recognize signs and symptoms of STROKE:    F-face looks uneven    A-arms unable to move or move unevenly    S-speech slurred or non-existent    T-time-call 911 as soon as signs and symptoms begin-DO NOT go       Back to bed or wait to see if you get better-TIME IS BRAIN. Warning Signs of HEART ATTACK     Call 911 if you have these symptoms:   Chest discomfort.  Most heart attacks involve discomfort in the center of the chest that lasts more than a few minutes, or that goes away and comes back. It can feel like uncomfortable pressure, squeezing, fullness, or pain.  Discomfort in other areas of the upper body. Symptoms can include pain or discomfort in one or both arms, the back, neck, jaw, or stomach.  Shortness of breath with or without chest discomfort.  Other signs may include breaking out in a cold sweat, nausea, or lightheadedness. Don't wait more than five minutes to call 911 - MINUTES MATTER! Fast action can save your life. Calling 911 is almost always the fastest way to get lifesaving treatment. Emergency Medical Services staff can begin treatment when they arrive -- up to an hour sooner than if someone gets to the hospital by car. The discharge information has been reviewed with the patient and spouse. The patient and spouse verbalized understanding. Discharge medications reviewed with the patient and spouse and appropriate educational materials and side effects teaching were provided.   ___________________________________________________________________________________________________________________________________    Patient armband removed and shredded

## 2018-03-14 NOTE — H&P
This 61year old female presents for Abnormal Barium Swallow. History of Present Illness:  1. Abnormal Barium Swallow      The symptoms began on 2018. Barium Swallow 18 indication dysphagia, wall thickening of the gastric cardiac region on CT. Impression mild esophageal dysmotility, mild irregularity involving the mid to distal esophageal mucosa. Suggest endoscopy for further assessment. No significant abnormality seen at the GE junction corresponding to the CT finding. This appears to be secondary to underdistention at the GE junction. 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 Pulmonary Embolism 6 months ago in lungs, and a subcutaneous one in the leg, Xarelto daily. She had never had a EGD. Colonoscopy  long and redundant tortuous colon, severe left sided diverticulosis. Tubular adenoma in the ascending colon. No fm hx of IBD. BM daily, no blood. She denies GERD sxs now however, she had GERD during pregnancy. She c/o pain in her right knee. Chronic conditions addressed today:  Diagnosis Description Code Status HPI Comments   BMI 45.0-49.9, adult Z68.41         PROBLEM LIST:  Problem Description Onset Date   BMI 45.0-49.9, adult 2016         PAST MEDICAL/SURGICAL HISTORY   (Detailed)    Disease/disorder Onset Date Management Date Comments   Cancer, renal  right nephrectomy      Deaf L ear. left ankle injury s/p surgical repair with hardware       Pulmonary Embolism.        Thyroid disease       Hypertension         DIAGNOSTICS HISTORY:  Test Ordered Interpretation Result completed   X-RAY KNEE, 4 OR MORE VIEWS 03/15/2016   03/15/2016     Test Ordered Ordering Comments Modifier   X-RAY KNEE, 4 OR MORE VIEWS 03/15/2016  RT     Family History:  (Detailed)  Relationship Family Member Name  Age at Death Condition Onset Age Cause of Death       Family history of Cancer, breast  N Family history of COPD  N   Father  Y 79      Mother  Y 76 Hypertension  N         Social History:  (Detailed)  Tobacco use reviewed. Preferred language is [place]. MARITAL STATUS/FAMILY/SOCIAL SUPPORT  Currently . Smoking status: Never smoker. SMOKING STATUS  Use Status Type Smoking Status Usage Per Day Years Used Total Pack Years   no/never  Never smoker          ALCOHOL  There is a history of alcohol use. Type: Wine. 1 drink consumed socially. Last alcoholic drink was last month. Patient Status   Completed with information received for patient transitioning into care. Medication Reconciliation  Medications reconciled today.   Medication Reviewed  Adherence Medication Name Sig Desc Elsewhere Status   taking as directed furosemide 20 mg tablet take 1 tablet by oral route  every day N Verified   taking as directed levothyroxine 200 mcg tablet take 1 tablet by oral route  every day N Verified   taking as directed lisinopril 40 mg tablet take 1 tablet by oral route  every day N Verified   taking as directed Xarelto 20 mg tablet take 1 tablet by oral route  every day with the evening meal N Verified   taking as directed Cardizem  mg capsule,extended release take 1 capsule by oral route  every day N Verified     Medications (added, continued or stopped this visit):  Started Medication Directions Instruction Stopped   02/19/2018 Cardizem  mg capsule,extended release take 1 capsule by oral route  every day     07/05/2017 furosemide 20 mg tablet take 1 tablet by oral route  every day     07/05/2017 levothyroxine 200 mcg tablet take 1 tablet by oral route  every day     07/05/2017 lisinopril 40 mg tablet take 1 tablet by oral route  every day     09/18/2017 Xarelto 20 mg tablet take 1 tablet by oral route  every day with the evening meal       Allergies:  Ingredient Reaction Medication Name Comment   NO KNOWN ALLERGIES          Review of Systems  System Neg/Pos Details Constitutional Negative Chills, fever, malaise and weight loss. ENMT Negative Nasal congestion and sore throat. Eyes Negative Double vision. Respiratory Negative Asthma and chronic cough. Cardio Negative Chest pain, edema and irregular heartbeat/palpitations. GI Negative Abdominal pain, change in bowel habits, constipation, decreased appetite, diarrhea, dysphagia, heartburn, hematemesis, hematochezia, melena, nausea, reflux and vomiting.  Negative Dysuria and hematuria. Endocrine Negative Cold intolerance, heat intolerance and increased thirst.   Neuro Negative Dizziness, headache, numbness, tremors and vertigo. Psych Negative Anxiety, depression and increased stress. Integumentary Negative Hives. MS Positive Joint pain. MS Negative Back pain and myalgia. Hema/Lymph Negative Easy bleeding and easy bruising. Allergic/Immuno Negative Contact allergy, food allergies and seasonal allergies. Vital Signs     Height  Time ft in cm Last Measured Height Position   2:27 PM 5.0 6.00 167.64 03/08/2018 0     Weight/BSA/BMI  Time lb oz kg Context BMI kg/m2 BSA m2   2:27 .00  139.706 dressed with shoes 49.71      Date/Time Temp Pulse BP MAP (Calculated) Arterial Line 1 BP (mmHg) BP Patient Position Resp SpO2 O2 Device O2 Flow Rate (L/min) Pre/Post Ductal Weight      03/14/18 1208 97.8 °F (36.6 °C) 88 116/44 68 -- -- 15 99 % Room air -- -- --     03/14/18 1135 -- -- -- -- -- -- -- -- -- -- -- 139.5 kg (307 lb 8 oz)           PHYSICAL EXAM:  Exam Findings Details   Constitutional Normal No acute distress. Well Nourished. Well developed.    Eyes Normal General - Right: Normal, Left: Normal. Conjunctiva - Right: Normal, Left: Normal. Sclera - Right: Normal, Left: Normal. Pupil - Right: Normal, Left: Normal.   Nose/Mouth/Throat Normal Lips/teeth/gums - Normal. Tongue - Normal. Buccal mucosa - Normal. Palate & uvula - Normal.   Neck Exam Normal Inspection - Normal. Palpation - Normal. Thyroid gland - Normal. Submandibular lymph nodes - Normal.   Lymph Detail Normal Submandibular. Anterior cervical. Posterior cervical. Supraclavicular. Respiratory Normal Inspection - Normal. Auscultation - Normal. Percussion - Normal. Cough - Absent. Effort - Normal.   Cardiovascular Normal Heart rate - Regular rate. Heart sounds - Normal S1, Normal S2. Murmurs - None. Extremities - No edema. Abdomen Normal Inspection - Normal. Appliance(s) - None. Abdominal muscles - Normal. Auscultation - Normal. Percussion - Normal. Anterior palpation - Normal, No guarding, No rebound. CVA tenderness - None. Umbilicus - Normal. No abdominal tenderness. No hepatic enlargement. No splenic enlargement. No hernia. No Ascites. No palpable mass. Lockwood's sign - Negative. Skin Normal Inspection - Normal.   Musculoskeletal Normal Hands - Left: Normal, Right: Normal.   Extremity Normal No cyanosis. No edema. Clubbing - Absent. Neurological Normal Level of consciousness - Normal. Orientation - Normal. Memory - Normal. Motor - Normal. Balance & gait - Normal. Coordination - Normal. Fine motor skills - Normal. DTRs - Normal.   Psychiatric Normal Orientation - Oriented to time, place, person & situation. Not anxious. Appropriate mood and affect. Behavior appropriate for age. Sufficient fund of knowledge. Sufficient language. No memory loss. Assessment/Plan  # Detail Type Description    1. Assessment Dyskinesia of esophagus (K22.4). Patient Plan 62 y/o female referred by Dr. Roxana Bustamante for Esophageal Dysmotility. 2. Assessment Abn findings on dx imaging of abd regions, inc retroperitonea (R93.5). Patient Plan while investigating the reason for massive pulmonary embolism that happened 6 months ago.  she had an abnormal Ct scan 10/14/ 2017  that showed thickening of the cardia but on barium swallow 2/1/ 2018  showed wall thickening of the distal esophagus and dysmotility but strangely the patient is completely asymptomatic from GI point of view. so for the security would do EGD    I explained to her the procedure of upper endoscopy or EGD, the alternative and the risks involved which include but not limited to aspiration, bleeding perforation or reaction to sedation. She was agreeable to this. 3. Assessment Personal history of colonic polyps (Z86.010). Patient Plan  Colonoscopy 2016 long and redundant tortuous colon, severe left colon  diverticulosis. Tubular adenoma in the ascending colon. her next colonoscopy in 5 years         4. Assessment Essential (primary) hypertension (I10). Patient Plan Controlled with Lisinopril.  she is morbidly obese 308         5. Assessment Other pulmonary embolism without acute cor pulmonale (I26.99). Patient Plan Onset 6 months ago controlled with Xarelto. Do not stop Xarelto for EGD procedure         6. Assessment Acquired absence of kidney (Z90.5). Patient Plan Right Nephrectomy 2007. 7. Assessment BMI 45.0-49.9, adult (Z68.41), chronic. Patient Plan  High BMI 49.7. I encouraged her to continue watching her diet and exercising. I gave her some practical advices and tips to achieve this.

## 2018-03-14 NOTE — IP AVS SNAPSHOT
[address] [place] [id number] 
[phone] Patient: Eliel Pollard MRN: [id number] ENV:[id number] About your hospitalization You were admitted on:  March 14, 2018 You last received care in the:  [facility] ENDOSCOPY You were discharged on:  March 14, 2018 Why you were hospitalized Your primary diagnosis was:  Not on File Follow-up Information None Discharge Orders None A check kat indicates which time of day the medication should be taken. My Medications ASK your doctor about these medications Instructions Each Dose to Equal  
 Morning Noon Evening Bedtime CARDIZEM  mg Tb24 tablet Generic drug:  dilTIAZem ER Your last dose was: Your next dose is: Take 240 mg by mouth daily. 240 mg  
    
   
   
   
  
 furosemide 20 mg tablet Commonly known as:  LASIX Your last dose was: Your next dose is: Take 20 mg by mouth daily. 20 mg  
    
   
   
   
  
 levothyroxine 200 mcg tablet Commonly known as:  SYNTHROID Your last dose was: Your next dose is: Take 200 mcg by mouth Daily (before breakfast). 200 mcg  
    
   
   
   
  
 lisinopril 40 mg tablet Commonly known as:  Cleotis Quevedo Your last dose was: Your next dose is: Take 40 mg by mouth daily. 40 mg  
    
   
   
   
  
 rivaroxaban 15 mg (42)- 20 mg (9) Dspk Commonly known as:  Doreen Pollen Your last dose was: Your next dose is: Take one 15 mg tablet twice a day with food for the first 21 days. Then, take one 20 mg tablet once a day with food for 9 days. TYLENOL EXTRA STRENGTH 500 mg tablet Generic drug:  acetaminophen Your last dose was: Your next dose is: Take 1,000 mg by mouth every six (6) hours as needed for Pain.   
 1000 mg  
 Discharge Instructions [address] [facility] [id number] 
1955 EGD DISCHARGE INSTRUCTIONS Discomfort: 
Sore throat- throat lozenges or warm salt water gargle 
redness at IV site- apply warm compress to area; if redness or soreness persist- contact your physician Gaseous discomfort- walking, belching will help relieve any discomfort You may not operate a vehicle until the next day You may not engage in an occupation involving machinery or appliances until the next day You may not drink alcoholic beverages until the next day Avoid making any critical decisions for at least 24 hour DIET You may not resume your regular diet. Antireflux diet. ACTIVITY You may not resume your normal daily activities Spend the remainder of the day resting -  avoid any strenuous activity. CALL M.D. ANY SIGN OF Increasing pain, nausea, vomiting Abdominal distension (swelling) New increased bleeding (oral or rectal) Fever (chills) Pain in chest area Bloody discharge from nose or mouth Shortness of breath You may take any Advil, Aspirin, Ibuprofen, Motrin, Aleve, or Goodys but preferably Tylenol as needed for pain. Follow-up Instructions: Follow-up in the office as scheduled or make a follow-up appointment in 2 weeks after the CT of the chest. 
  
Lexi Fletcher MD 
March 14, 2018 Татьяна Fuentes DISCHARGE SUMMARY from Nurse PATIENT INSTRUCTIONS: 
 
 
F-face looks uneven A-arms unable to move or move unevenly S-speech slurred or non-existent T-time-call 911 as soon as signs and symptoms begin-DO NOT go Back to bed or wait to see if you get better-TIME IS BRAIN. Warning Signs of HEART ATTACK Call 911 if you have these symptoms: ? Chest discomfort. Most heart attacks involve discomfort in the center of the chest that lasts more than a few minutes, or that goes away and comes back. It can feel like uncomfortable pressure, squeezing, fullness, or pain. ? Discomfort in other areas of the upper body. Symptoms can include pain or discomfort in one or both arms, the back, neck, jaw, or stomach. ? Shortness of breath with or without chest discomfort. ? Other signs may include breaking out in a cold sweat, nausea, or lightheadedness. Don't wait more than five minutes to call [address]! Fast action can save your life. Calling 911 is almost always the fastest way to get lifesaving treatment. Emergency Medical Services staff can begin treatment when they arrive  up to an hour sooner than if someone gets to the hospital by car. The discharge information has been reviewed with the patient and spouse. The patient and spouse verbalized understanding. Discharge medications reviewed with the patient and spouse and appropriate educational materials and side effects teaching were provided. ___________________________________________________________________________________________________________________________________ Patient armband removed and shredded Introducing [facility] SERVICES! Kori Tomas introduces Social[employer] patient portal. Now you can access parts of your medical record, email your doctor's office, and request medication refills online. 1. In your internet browser, go to [email or web address] 2. Click on the First Time User? Click Here link in the Sign In box. You will see the New Member Sign Up page. 3. Enter your Social[employer] Access Code exactly as it appears below. You will not need to use this code after youve completed the sign-up process. If you do not sign up before the expiration date, you must request a new code. · Social[employer] Access Code: [email or web address] Expires: 6/12/2018  3:23 PM 
 4. Enter the last four digits of your Social Security Number (xxxx) and Date of Birth (mm/dd/yyyy) as indicated and click Submit. You will be taken to the next sign-up page. 5. Create a [employer] ID. This will be your [employer] login ID and cannot be changed, so think of one that is secure and easy to remember. 6. Create a [employer] password. You can change your password at any time. 7. Enter your Password Reset Question and Answer. This can be used at a later time if you forget your password. 8. Enter your e-mail address. You will receive e-mail notification when new information is available in [address] 9. Click Sign Up. You can now view and download portions of your medical record. 10. Click the Download Summary menu link to download a portable copy of your medical information. If you have questions, please visit the Frequently Asked Questions section of the [employer] website. Remember, [employer] is NOT to be used for urgent needs. For medical emergencies, dial 911. Now available from your iPhone and Android! Providers Seen During Your Hospitalization Provider Specialty Primary office phone Berta Pizano MD Gastroenterology [phone] Your Primary Care Physician (PCP) Primary Care Physician Office Phone Office Fax Haydee Blanton [phone] You are allergic to the following No active allergies Recent Documentation Height Weight Breastfeeding? BMI OB Status Smoking Status 1.676 m 139.5 kg No 49.63 kg/m2 Hysterectomy Never Smoker Emergency Contacts Name Discharge Info Relation Home Work Mobile [address] CAREGIVER [3] Spouse [3] [phone] Patient Belongings The following personal items are in your possession at time of discharge: 
  Dental Appliances: Uppers (removed)  Visual Aid: Glasses, At bedside Please provide this summary of care documentation to your next provider. Signatures-by signing, you are acknowledging that this After Visit Summary has been reviewed with you and you have received a copy. Patient Signature:  ____________________________________________________________ Date:  ____________________________________________________________  
  
Serge Officer Provider Signature:  ____________________________________________________________ Date:  ____________________________________________________________

## 2018-07-07 ENCOUNTER — HOSPITAL ENCOUNTER (OUTPATIENT)
Dept: ULTRASOUND IMAGING | Age: 63
Discharge: HOME | End: 2018-07-07
Attending: INTERNAL MEDICINE
Payer: COMMERCIAL

## 2018-07-07 DIAGNOSIS — R16.1 SPLENOMEGALY: ICD-10-CM

## 2018-07-07 PROCEDURE — 76705 ECHO EXAM OF ABDOMEN: CPT

## 2019-04-23 ENCOUNTER — ANESTHESIA EVENT (OUTPATIENT)
Dept: SURGERY | Age: 64
End: 2019-04-23
Payer: COMMERCIAL

## 2019-04-23 ENCOUNTER — ANESTHESIA (OUTPATIENT)
Dept: SURGERY | Age: 64
End: 2019-04-23
Payer: COMMERCIAL

## 2019-04-23 ENCOUNTER — HOSPITAL ENCOUNTER (OUTPATIENT)
Age: 64
Setting detail: OUTPATIENT SURGERY
Discharge: HOME | End: 2019-04-23
Attending: OPHTHALMOLOGY | Admitting: OPHTHALMOLOGY
Payer: COMMERCIAL

## 2019-04-23 VITALS
WEIGHT: 293 LBS | HEART RATE: 55 BPM | BODY MASS INDEX: 47.09 KG/M2 | RESPIRATION RATE: 16 BRPM | OXYGEN SATURATION: 99 % | HEIGHT: 66 IN | SYSTOLIC BLOOD PRESSURE: 110 MMHG | DIASTOLIC BLOOD PRESSURE: 51 MMHG | TEMPERATURE: 96.8 F

## 2019-04-23 PROBLEM — H53.8 BLURRED VISION: Status: ACTIVE | Noted: 2019-04-23

## 2019-04-23 PROBLEM — H53.8 BLURRED VISION: Status: RESOLVED | Noted: 2019-04-23 | Resolved: 2019-04-23

## 2019-04-23 PROCEDURE — 74011250636 HC RX REV CODE- 250/636

## 2019-04-23 PROCEDURE — 77030013340: Performed by: OPHTHALMOLOGY

## 2019-04-23 PROCEDURE — 76060000032 HC ANESTHESIA 0.5 TO 1 HR: Performed by: OPHTHALMOLOGY

## 2019-04-23 PROCEDURE — 76010000138 HC OR TIME 0.5 TO 1 HR: Performed by: OPHTHALMOLOGY

## 2019-04-23 PROCEDURE — 74011250636 HC RX REV CODE- 250/636: Performed by: CLINICAL NURSE SPECIALIST

## 2019-04-23 PROCEDURE — 77030013389: Performed by: OPHTHALMOLOGY

## 2019-04-23 PROCEDURE — 74011000250 HC RX REV CODE- 250: Performed by: CLINICAL NURSE SPECIALIST

## 2019-04-23 PROCEDURE — V2632 POST CHMBR INTRAOCULAR LENS: HCPCS | Performed by: OPHTHALMOLOGY

## 2019-04-23 PROCEDURE — 77030040361 HC SLV COMPR DVT MDII -B: Performed by: OPHTHALMOLOGY

## 2019-04-23 PROCEDURE — 74011000250 HC RX REV CODE- 250: Performed by: OPHTHALMOLOGY

## 2019-04-23 PROCEDURE — 76210000021 HC REC RM PH II 0.5 TO 1 HR: Performed by: OPHTHALMOLOGY

## 2019-04-23 PROCEDURE — 74011250636 HC RX REV CODE- 250/636: Performed by: OPHTHALMOLOGY

## 2019-04-23 DEVICE — LENS IOL POST 1-PC 6X13 16.5 -- ACRYSOF: Type: IMPLANTABLE DEVICE | Site: EYE | Status: FUNCTIONAL

## 2019-04-23 RX ORDER — KETOROLAC TROMETHAMINE 5 MG/ML
1 SOLUTION OPHTHALMIC
Status: COMPLETED | OUTPATIENT
Start: 2019-04-23 | End: 2019-04-23

## 2019-04-23 RX ORDER — NALOXONE HYDROCHLORIDE 0.4 MG/ML
0.1 INJECTION, SOLUTION INTRAMUSCULAR; INTRAVENOUS; SUBCUTANEOUS
Status: CANCELLED | OUTPATIENT
Start: 2019-04-23

## 2019-04-23 RX ORDER — MIDAZOLAM HYDROCHLORIDE 1 MG/ML
INJECTION, SOLUTION INTRAMUSCULAR; INTRAVENOUS AS NEEDED
Status: DISCONTINUED | OUTPATIENT
Start: 2019-04-23 | End: 2019-04-23 | Stop reason: HOSPADM

## 2019-04-23 RX ORDER — TROPICAMIDE 10 MG/ML
1 SOLUTION/ DROPS OPHTHALMIC
Status: COMPLETED | OUTPATIENT
Start: 2019-04-23 | End: 2019-04-23

## 2019-04-23 RX ORDER — OXYCODONE AND ACETAMINOPHEN 5; 325 MG/1; MG/1
1 TABLET ORAL AS NEEDED
Status: CANCELLED | OUTPATIENT
Start: 2019-04-23

## 2019-04-23 RX ORDER — EPINEPHRINE 1 MG/ML
INJECTION, SOLUTION, CONCENTRATE INTRAVENOUS AS NEEDED
Status: DISCONTINUED | OUTPATIENT
Start: 2019-04-23 | End: 2019-04-23 | Stop reason: HOSPADM

## 2019-04-23 RX ORDER — SODIUM CHLORIDE, SODIUM LACTATE, POTASSIUM CHLORIDE, CALCIUM CHLORIDE 600; 310; 30; 20 MG/100ML; MG/100ML; MG/100ML; MG/100ML
50 INJECTION, SOLUTION INTRAVENOUS CONTINUOUS
Status: CANCELLED | OUTPATIENT
Start: 2019-04-23

## 2019-04-23 RX ORDER — ONDANSETRON 2 MG/ML
INJECTION INTRAMUSCULAR; INTRAVENOUS AS NEEDED
Status: DISCONTINUED | OUTPATIENT
Start: 2019-04-23 | End: 2019-04-23 | Stop reason: HOSPADM

## 2019-04-23 RX ORDER — PROPARACAINE HYDROCHLORIDE 5 MG/ML
SOLUTION/ DROPS OPHTHALMIC AS NEEDED
Status: DISCONTINUED | OUTPATIENT
Start: 2019-04-23 | End: 2019-04-23 | Stop reason: HOSPADM

## 2019-04-23 RX ORDER — SODIUM CHLORIDE, SODIUM LACTATE, POTASSIUM CHLORIDE, CALCIUM CHLORIDE 600; 310; 30; 20 MG/100ML; MG/100ML; MG/100ML; MG/100ML
75 INJECTION, SOLUTION INTRAVENOUS CONTINUOUS
Status: DISCONTINUED | OUTPATIENT
Start: 2019-04-23 | End: 2019-04-23 | Stop reason: HOSPADM

## 2019-04-23 RX ORDER — FENTANYL CITRATE 50 UG/ML
INJECTION, SOLUTION INTRAMUSCULAR; INTRAVENOUS AS NEEDED
Status: DISCONTINUED | OUTPATIENT
Start: 2019-04-23 | End: 2019-04-23 | Stop reason: HOSPADM

## 2019-04-23 RX ORDER — ONDANSETRON 2 MG/ML
4 INJECTION INTRAMUSCULAR; INTRAVENOUS ONCE
Status: CANCELLED | OUTPATIENT
Start: 2019-04-23 | End: 2019-04-23

## 2019-04-23 RX ORDER — PHENYLEPHRINE HYDROCHLORIDE 25 MG/ML
1 SOLUTION/ DROPS OPHTHALMIC
Status: COMPLETED | OUTPATIENT
Start: 2019-04-23 | End: 2019-04-23

## 2019-04-23 RX ADMIN — ONDANSETRON 4 MG: 2 INJECTION INTRAMUSCULAR; INTRAVENOUS at 07:42

## 2019-04-23 RX ADMIN — SODIUM CHLORIDE, SODIUM LACTATE, POTASSIUM CHLORIDE, AND CALCIUM CHLORIDE 75 ML/HR: 600; 310; 30; 20 INJECTION, SOLUTION INTRAVENOUS at 06:46

## 2019-04-23 RX ADMIN — MIDAZOLAM HYDROCHLORIDE 1 MG: 1 INJECTION, SOLUTION INTRAMUSCULAR; INTRAVENOUS at 07:29

## 2019-04-23 RX ADMIN — KETOROLAC TROMETHAMINE 1 DROP: 5 SOLUTION OPHTHALMIC at 06:21

## 2019-04-23 RX ADMIN — LIDOCAINE HYDROCHLORIDE 2 DROP: 35 GEL OPHTHALMIC at 06:44

## 2019-04-23 RX ADMIN — FENTANYL CITRATE 50 MCG: 50 INJECTION, SOLUTION INTRAMUSCULAR; INTRAVENOUS at 07:36

## 2019-04-23 RX ADMIN — SODIUM CHLORIDE, SODIUM LACTATE, POTASSIUM CHLORIDE, AND CALCIUM CHLORIDE: 600; 310; 30; 20 INJECTION, SOLUTION INTRAVENOUS at 07:29

## 2019-04-23 RX ADMIN — TROPICAMIDE 1 DROP: 10 SOLUTION/ DROPS OPHTHALMIC at 06:38

## 2019-04-23 RX ADMIN — PHENYLEPHRINE HYDROCHLORIDE 1 DROP: 2.5 SOLUTION/ DROPS OPHTHALMIC at 06:38

## 2019-04-23 RX ADMIN — TROPICAMIDE 1 DROP: 10 SOLUTION/ DROPS OPHTHALMIC at 06:21

## 2019-04-23 RX ADMIN — PHENYLEPHRINE HYDROCHLORIDE 1 DROP: 2.5 SOLUTION/ DROPS OPHTHALMIC at 06:33

## 2019-04-23 RX ADMIN — KETOROLAC TROMETHAMINE 1 DROP: 5 SOLUTION OPHTHALMIC at 06:33

## 2019-04-23 RX ADMIN — TROPICAMIDE 1 DROP: 10 SOLUTION/ DROPS OPHTHALMIC at 06:33

## 2019-04-23 RX ADMIN — MIDAZOLAM HYDROCHLORIDE 1 MG: 1 INJECTION, SOLUTION INTRAMUSCULAR; INTRAVENOUS at 07:59

## 2019-04-23 RX ADMIN — PHENYLEPHRINE HYDROCHLORIDE 1 DROP: 2.5 SOLUTION/ DROPS OPHTHALMIC at 06:43

## 2019-04-23 RX ADMIN — KETOROLAC TROMETHAMINE 1 DROP: 5 SOLUTION OPHTHALMIC at 06:38

## 2019-04-23 RX ADMIN — KETOROLAC TROMETHAMINE 1 DROP: 5 SOLUTION OPHTHALMIC at 06:43

## 2019-04-23 RX ADMIN — PHENYLEPHRINE HYDROCHLORIDE 1 DROP: 2.5 SOLUTION/ DROPS OPHTHALMIC at 06:28

## 2019-04-23 RX ADMIN — MIDAZOLAM HYDROCHLORIDE 1 MG: 1 INJECTION, SOLUTION INTRAMUSCULAR; INTRAVENOUS at 07:37

## 2019-04-23 RX ADMIN — KETOROLAC TROMETHAMINE 1 DROP: 5 SOLUTION OPHTHALMIC at 06:28

## 2019-04-23 RX ADMIN — TROPICAMIDE 1 DROP: 10 SOLUTION/ DROPS OPHTHALMIC at 06:28

## 2019-04-23 RX ADMIN — PHENYLEPHRINE HYDROCHLORIDE 1 DROP: 2.5 SOLUTION/ DROPS OPHTHALMIC at 06:21

## 2019-04-23 RX ADMIN — MIDAZOLAM HYDROCHLORIDE 1 MG: 1 INJECTION, SOLUTION INTRAMUSCULAR; INTRAVENOUS at 07:46

## 2019-04-23 RX ADMIN — TROPICAMIDE 1 DROP: 10 SOLUTION/ DROPS OPHTHALMIC at 06:43

## 2019-04-23 RX ADMIN — FENTANYL CITRATE 50 MCG: 50 INJECTION, SOLUTION INTRAMUSCULAR; INTRAVENOUS at 07:29

## 2019-04-23 NOTE — INTERVAL H&P NOTE
H&P Update: 
Abiola Helm was seen and examined. History and physical has been reviewed. The patient has been examined.  There have been no significant clinical changes since the completion of the originally dated History and Physical.

## 2019-04-23 NOTE — ANESTHESIA POSTPROCEDURE EVALUATION
Procedure(s): CATARACT EXTRACTION WITH INTRA OCULAR LENS IMPLANT, LIMBAL RELAXING INC ISION  RIGHT EYE. MAC Anesthesia Post Evaluation Patient participation: complete - patient participated Level of consciousness: awake Pain management: adequate Cardiovascular status: acceptable Respiratory status: acceptable Hydration status: acceptable Post anesthesia nausea and vomiting:  none Vitals Value Taken Time /62 4/23/2019  8:26 AM  
Temp 36 °C (96.8 °F) 4/23/2019  8:26 AM  
Pulse 76 4/23/2019  8:26 AM  
Resp 16 4/23/2019  8:26 AM  
SpO2 100 % 4/23/2019  8:26 AM

## 2019-04-23 NOTE — PERIOP NOTES
Primary Nurse Elsy Cooper RN and Nimesh Walton RN performed a dual skin assessment on this patient Reviewed PTA medication list with patient/caregiver and patient/caregiver denies any additional medications.  Patient admits to having a responsible adult care for them for at least 24 hours after surgery.

## 2019-04-23 NOTE — PERIOP NOTES
PATIENT SUPPLIED POST-OP DROPS: ILEVRO, BESIVANCE, LOTEMAX  1 GTT EACH right EYE AT END OF CASE. SUNGLASSES APPLIED AT END OF CASE.

## 2019-04-23 NOTE — OP NOTES
Cataract Operative Note      Patient: Abiola Helm               Sex: female          DOA: 4/23/2019         YOB: 1955      Age:  59 y.o.        LOS:  LOS: 0 days     Preoperative Diagnosis: Cataract right eye    Postoperative Diagnosis:  Cataract  right eye  Surgeon: Delma Lafleur M.D. Anesthesia:  Topical anesthesia  Procedure:  Phacoemulsification of posterior chamber for intraocular lens implantation right    Fluids:  0    Procedure in Detail: The operative eye was prepped and draped in the usual fashion. A lid speculum was placed in the operative eye. A clear cornea approach was utilized. A paracentesis incision(s) was constructed with a 1 mm slit knife. One percent preservative-free lidocaine followed by viscoelastic was instilled into the anterior chamber. A clear corneal incision was made with a slit knife. A continuous curvilinear capsulorrhexis was constructed followed by hydrodissection. A phacoemulsification tip was placed into the eye, and the lens nucleus was emulsified. The irrigation/aspiration device was then used to remove any remaining cortical material.  Polishing of the capsule was performed as needed. The intraocular lens was then placed into the capsular bag after it was re-inflated with viscoelastic. The remaining viscoelastic was then removed using the irrigation/aspiration device. BSS on a cannula was then used to hydrate the wound edges. At the end of the procedure the wound was found to be watertight, the anterior chamber was deep and the pupil round. No blood loss during surgery. An antibiotic and anti-inflammatroy was placed into the operative eye. The lid speculum was removed. Protective sunglasses were then placed onto the patient. The patient was taken to the [address] [place] [address] Unit (PACU) in good condition having tolerated the procedure well. Estimated Blood Loss: 0                 Implants:   Implant Name Type Inv.  Item Serial No.  Lot No. LRB No. Used Action   LENS IOL POST 1-PC 6X13 16.5 -- [employer] - [id number] Intraocular Lens LENS IOL POST 1-PC 6X13 16.5 -- [employer] [id number] [employer]  Right 1 Implanted       Specimens: * No specimens in log *            Complications:  None           Khadar Mc.  CONNOR Ritchie  8:31 AM

## 2019-04-23 NOTE — DISCHARGE INSTRUCTIONS
Patient Education     DISCHARGE SUMMARY from Nurse    PATIENT INSTRUCTIONS:    After general anesthesia or intravenous sedation, for 24 hours or while taking prescription Narcotics:  · Limit your activities  · Do not drive and operate hazardous machinery  · Do not make important personal or business decisions  · Do  not drink alcoholic beverages  · If you have not urinated within 8 hours after discharge, please contact your surgeon on call. Report the following to your surgeon:  · Excessive pain, swelling, redness or odor of or around the surgical area  · Temperature over 100.5  · Nausea and vomiting lasting longer than 4 hours or if unable to take medications  · Any signs of decreased circulation or nerve impairment to extremity: change in color, persistent  numbness, tingling, coldness or increase pain  · Any questions    What to do at Home:  [place] TO OFFICE ON Wednesday AS SCHEDULED    If you experience any of the following symptoms bleeding, nausea, severe pain, please follow up with dr Catie Jones    *  Please give a list of your current medications to your Primary Care Provider. *  Please update this list whenever your medications are discontinued, doses are      changed, or new medications (including over-the-counter products) are added. *  Please carry medication information at all times in case of emergency situations. These are general instructions for a healthy lifestyle:    No smoking/ No tobacco products/ Avoid exposure to second hand smoke  Surgeon General's Warning:  Quitting smoking now greatly reduces serious risk to your health.     Obesity, smoking, and sedentary lifestyle greatly increases your risk for illness    A healthy diet, regular physical exercise & weight monitoring are important for maintaining a healthy lifestyle    You may be retaining fluid if you have a history of heart failure or if you experience any of the following symptoms:  Weight gain of 3 pounds or more overnight or 5 pounds in a week, increased swelling in our hands or feet or shortness of breath while lying flat in bed. Please call your doctor as soon as you notice any of these symptoms; do not wait until your next office visit. Recognize signs and symptoms of STROKE:    F-face looks uneven    A-arms unable to move or move unevenly    S-speech slurred or non-existent    T-time-call 911 as soon as signs and symptoms begin-DO NOT go       Back to bed or wait to see if you get better-TIME IS BRAIN. Warning Signs of HEART ATTACK     Call 911 if you have these symptoms:   Chest discomfort. Most heart attacks involve discomfort in the center of the chest that lasts more than a few minutes, or that goes away and comes back. It can feel like uncomfortable pressure, squeezing, fullness, or pain.  Discomfort in other areas of the upper body. Symptoms can include pain or discomfort in one or both arms, the back, neck, jaw, or stomach.  Shortness of breath with or without chest discomfort.  Other signs may include breaking out in a cold sweat, nausea, or lightheadedness. Don't wait more than five minutes to call 911 - MINUTES MATTER! Fast action can save your life. Calling 911 is almost always the fastest way to get lifesaving treatment. Emergency Medical Services staff can begin treatment when they arrive -- up to an hour sooner than if someone gets to the hospital by car. The discharge information has been reviewed with the patient and caregiver. The patient and caregiver verbalized understanding. Discharge medications reviewed with the patient and caregiver and appropriate educational materials and side effects teaching were provided.   ___________________________________________________________________________________________________________________________________     Cataract Surgery: What to Expect at Home  Your Recovery    After surgery, your eye will not hurt. But it may feel scratchy, sticky, or uncomfortable. It may also water more than usual.  Most people see better 1 to 3 days after surgery. But it could take 3 to 10 weeks to get the full benefits of surgery and to see as clearly as possible. Your doctor may send you home with a bandage, patch, or clear shield on your eye. This will keep you from rubbing your eye. Your doctor will also give you eyedrops to help your eye heal. Use them exactly as directed. You can read or watch TV right away, but things may look blurry. Most people are able to return to work or their normal routine in 1 to 3 days. After your eye heals, you may still need to wear glasses, especially for reading. This care sheet gives you a general idea about how long it will take for you to recover. But each person recovers at a different pace. Follow the steps below to get better as quickly as possible. How can you care for yourself at home? Activity    · Rest when you feel tired. Getting enough sleep will help you recover.     · You may have trouble judging distances for a few days. Move slowly, and be careful going up and down stairs and pouring hot liquids. Ask for help if you need it.     · Ask your doctor when it is okay to drive.     · Wear your eye bandage, patch, or shield for as long as your doctor recommends. You may only need to wear it when you sleep.     · You can shower or wash your hair the day after surgery. Keep water, soap, shampoo, hair spray, and shaving lotion out of your eye, especially for the first week.     · Do not rub or put pressure on your eye for at least 1 week.     · Do not wear eye makeup for 1 to 2 weeks.  You may also want to avoid face cream or lotion.     · Do not get your hair colored or permed for 10 days after surgery.     · Do not bend over or do any strenuous activities, such as biking, jogging, weight lifting, or aerobic exercise, for 2 weeks or until your doctor says it is okay.     · Avoid swimming, hot tubs, gardening, and dusting for 1 to 2 weeks.     · Wear sunglasses on bright days for at least 1 year after surgery. Medicines    · Your doctor will tell you if and when you can restart your medicines. He or she will also give you instructions about taking any new medicines.     · If you take blood thinners, such as warfarin (Coumadin), clopidogrel (Plavix), or aspirin, be sure to talk to your doctor. He or she will tell you if and when to start taking those medicines again. Make sure that you understand exactly what your doctor wants you to do.     · Follow your doctor's instructions for when to use your eyedrops. Always wash your hands before you put your drops in. To put in eyedrops:  ? Tilt your head back, and pull your lower eyelid down with one finger. ? Drop or squirt the medicine inside the lower lid. ? Close your eye for 30 to 60 seconds to let the drops or ointment move around. ? Do not touch the ointment or dropper tip to your eyelashes or any other surface.     · Follow your doctor's instructions for taking pain medicines. Follow-up care is a key part of your treatment and safety. Be sure to make and go to all appointments, and call your doctor if you are having problems. It's also a good idea to know your test results and keep a list of the medicines you take. When should you call for help? Call 911 anytime you think you may need emergency care. For example, call if:    · You passed out (lost consciousness).     · You have a sudden loss of vision.     · You have sudden chest pain, are short of breath, or cough up blood.    Call your doctor now or seek immediate medical care if:    · You have signs of an eye infection, such as:  ? Pus or thick discharge coming from the eye.  ? Redness or swelling around the eye.  ?  A fever.     · You have new or worse eye pain.     · You have vision changes.     · You have symptoms of a blood clot in your leg (called a deep vein thrombosis), such as:  ? Pain in the calf, back of the knee, thigh, or groin. ? Redness and swelling in your leg or groin.    Watch closely for changes in your health, and be sure to contact your doctor if:    · You do not get better as expected. Where can you learn more? Go to http://rj-raquel.info/. Enter R255 in the search box to learn more about \"Cataract Surgery: What to Expect at Home. \"  Current as of: July 17, 2018  Content Version: 11.9  © 8258-3291 [employer]. Care instructions adapted under license by [employer] (which disclaims liability or warranty for this information). If you have questions about a medical condition or this instruction, always ask your healthcare professional. [address] any warranty or liability for your use of this information.   Patient armband removed and shredded

## 2019-04-23 NOTE — ANESTHESIA PREPROCEDURE EVALUATION
Relevant Problems No relevant active problems Anesthetic History No history of anesthetic complications Review of Systems / Medical History Patient summary reviewed, nursing notes reviewed and pertinent labs reviewed Pulmonary Within defined limits Neuro/Psych Within defined limits Cardiovascular Hypertension: well controlled Dysrhythmias : sinus tachycardia Exercise tolerance: >4 METS 
  
GI/Hepatic/Renal 
Within defined limits Endo/Other Hypothyroidism: well controlled Morbid obesity and arthritis Other Findings Comments: PE  
 
 
 
Physical Exam 
 
Airway Mallampati: III 
TM Distance: 4 - 6 cm Neck ROM: normal range of motion Mouth opening: Normal 
 
 Cardiovascular Dental 
 
Dentition: Full upper dentures Pulmonary Abdominal 
 
 
 
 Other Findings Anesthetic Plan ASA: 3 Anesthesia type: MAC Induction: Intravenous Anesthetic plan and risks discussed with: Patient

## 2019-06-25 ENCOUNTER — ANESTHESIA (OUTPATIENT)
Dept: SURGERY | Age: 64
End: 2019-06-25
Payer: COMMERCIAL

## 2019-06-25 ENCOUNTER — HOSPITAL ENCOUNTER (OUTPATIENT)
Age: 64
Setting detail: OUTPATIENT SURGERY
Discharge: HOME | End: 2019-06-25
Attending: OPHTHALMOLOGY | Admitting: OPHTHALMOLOGY
Payer: COMMERCIAL

## 2019-06-25 ENCOUNTER — ANESTHESIA EVENT (OUTPATIENT)
Dept: SURGERY | Age: 64
End: 2019-06-25
Payer: COMMERCIAL

## 2019-06-25 VITALS
HEIGHT: 67 IN | TEMPERATURE: 97.8 F | HEART RATE: 73 BPM | OXYGEN SATURATION: 100 % | DIASTOLIC BLOOD PRESSURE: 46 MMHG | BODY MASS INDEX: 45.99 KG/M2 | RESPIRATION RATE: 14 BRPM | SYSTOLIC BLOOD PRESSURE: 108 MMHG | WEIGHT: 293 LBS

## 2019-06-25 PROBLEM — H53.8 BLURRED VISION: Status: ACTIVE | Noted: 2019-06-25

## 2019-06-25 PROBLEM — H53.8 BLURRED VISION: Status: RESOLVED | Noted: 2019-06-25 | Resolved: 2019-06-25

## 2019-06-25 PROCEDURE — 77030013340: Performed by: OPHTHALMOLOGY

## 2019-06-25 PROCEDURE — 77030013389: Performed by: OPHTHALMOLOGY

## 2019-06-25 PROCEDURE — 74011000250 HC RX REV CODE- 250: Performed by: OPHTHALMOLOGY

## 2019-06-25 PROCEDURE — 77030032490 HC SLV COMPR SCD KNE COVD -B: Performed by: OPHTHALMOLOGY

## 2019-06-25 PROCEDURE — 74011250636 HC RX REV CODE- 250/636

## 2019-06-25 PROCEDURE — 76060000032 HC ANESTHESIA 0.5 TO 1 HR: Performed by: OPHTHALMOLOGY

## 2019-06-25 PROCEDURE — 76210000021 HC REC RM PH II 0.5 TO 1 HR: Performed by: OPHTHALMOLOGY

## 2019-06-25 PROCEDURE — V2632 POST CHMBR INTRAOCULAR LENS: HCPCS | Performed by: OPHTHALMOLOGY

## 2019-06-25 PROCEDURE — 74011250636 HC RX REV CODE- 250/636: Performed by: OPHTHALMOLOGY

## 2019-06-25 PROCEDURE — 76010000138 HC OR TIME 0.5 TO 1 HR: Performed by: OPHTHALMOLOGY

## 2019-06-25 DEVICE — LENS IOL POST 1-PC 6X13 17.0 -- ACRYSOF: Type: IMPLANTABLE DEVICE | Site: EYE | Status: FUNCTIONAL

## 2019-06-25 RX ORDER — SODIUM CHLORIDE 0.9 % (FLUSH) 0.9 %
5-40 SYRINGE (ML) INJECTION AS NEEDED
Status: CANCELLED | OUTPATIENT
Start: 2019-06-25

## 2019-06-25 RX ORDER — INSULIN LISPRO 100 [IU]/ML
INJECTION, SOLUTION INTRAVENOUS; SUBCUTANEOUS ONCE
Status: CANCELLED | OUTPATIENT
Start: 2019-06-25 | End: 2019-06-25

## 2019-06-25 RX ORDER — MIDAZOLAM HYDROCHLORIDE 1 MG/ML
INJECTION, SOLUTION INTRAMUSCULAR; INTRAVENOUS AS NEEDED
Status: DISCONTINUED | OUTPATIENT
Start: 2019-06-25 | End: 2019-06-25 | Stop reason: HOSPADM

## 2019-06-25 RX ORDER — SODIUM CHLORIDE, SODIUM LACTATE, POTASSIUM CHLORIDE, CALCIUM CHLORIDE 600; 310; 30; 20 MG/100ML; MG/100ML; MG/100ML; MG/100ML
100 INJECTION, SOLUTION INTRAVENOUS CONTINUOUS
Status: CANCELLED | OUTPATIENT
Start: 2019-06-25

## 2019-06-25 RX ORDER — KETOROLAC TROMETHAMINE 5 MG/ML
1 SOLUTION OPHTHALMIC
Status: DISCONTINUED | OUTPATIENT
Start: 2019-06-25 | End: 2019-06-25 | Stop reason: HOSPADM

## 2019-06-25 RX ORDER — FENTANYL CITRATE 50 UG/ML
INJECTION, SOLUTION INTRAMUSCULAR; INTRAVENOUS AS NEEDED
Status: DISCONTINUED | OUTPATIENT
Start: 2019-06-25 | End: 2019-06-25 | Stop reason: HOSPADM

## 2019-06-25 RX ORDER — SODIUM CHLORIDE, SODIUM LACTATE, POTASSIUM CHLORIDE, CALCIUM CHLORIDE 600; 310; 30; 20 MG/100ML; MG/100ML; MG/100ML; MG/100ML
75 INJECTION, SOLUTION INTRAVENOUS CONTINUOUS
Status: DISCONTINUED | OUTPATIENT
Start: 2019-06-25 | End: 2019-06-25 | Stop reason: HOSPADM

## 2019-06-25 RX ORDER — PHENYLEPHRINE HYDROCHLORIDE 25 MG/ML
1 SOLUTION/ DROPS OPHTHALMIC
Status: COMPLETED | OUTPATIENT
Start: 2019-06-25 | End: 2019-06-25

## 2019-06-25 RX ORDER — FENTANYL CITRATE 50 UG/ML
25 INJECTION, SOLUTION INTRAMUSCULAR; INTRAVENOUS
Status: CANCELLED | OUTPATIENT
Start: 2019-06-25

## 2019-06-25 RX ORDER — MAGNESIUM SULFATE 100 %
4 CRYSTALS MISCELLANEOUS AS NEEDED
Status: CANCELLED | OUTPATIENT
Start: 2019-06-25

## 2019-06-25 RX ORDER — EPINEPHRINE 1 MG/ML
INJECTION, SOLUTION, CONCENTRATE INTRAVENOUS AS NEEDED
Status: DISCONTINUED | OUTPATIENT
Start: 2019-06-25 | End: 2019-06-25 | Stop reason: HOSPADM

## 2019-06-25 RX ORDER — SODIUM CHLORIDE 0.9 % (FLUSH) 0.9 %
5-40 SYRINGE (ML) INJECTION EVERY 8 HOURS
Status: CANCELLED | OUTPATIENT
Start: 2019-06-25

## 2019-06-25 RX ORDER — NALOXONE HYDROCHLORIDE 0.4 MG/ML
0.2 INJECTION, SOLUTION INTRAMUSCULAR; INTRAVENOUS; SUBCUTANEOUS AS NEEDED
Status: CANCELLED | OUTPATIENT
Start: 2019-06-25

## 2019-06-25 RX ORDER — TROPICAMIDE 10 MG/ML
1 SOLUTION/ DROPS OPHTHALMIC
Status: COMPLETED | OUTPATIENT
Start: 2019-06-25 | End: 2019-06-25

## 2019-06-25 RX ADMIN — TROPICAMIDE 1 DROP: 10 SOLUTION/ DROPS OPHTHALMIC at 08:29

## 2019-06-25 RX ADMIN — PHENYLEPHRINE HYDROCHLORIDE 1 DROP: 2.5 SOLUTION/ DROPS OPHTHALMIC at 08:14

## 2019-06-25 RX ADMIN — PHENYLEPHRINE HYDROCHLORIDE 1 DROP: 2.5 SOLUTION/ DROPS OPHTHALMIC at 08:19

## 2019-06-25 RX ADMIN — TROPICAMIDE 1 DROP: 10 SOLUTION/ DROPS OPHTHALMIC at 08:24

## 2019-06-25 RX ADMIN — MIDAZOLAM HYDROCHLORIDE 1 MG: 1 INJECTION, SOLUTION INTRAMUSCULAR; INTRAVENOUS at 10:45

## 2019-06-25 RX ADMIN — FENTANYL CITRATE 50 MCG: 50 INJECTION, SOLUTION INTRAMUSCULAR; INTRAVENOUS at 10:45

## 2019-06-25 RX ADMIN — PHENYLEPHRINE HYDROCHLORIDE 1 DROP: 2.5 SOLUTION/ DROPS OPHTHALMIC at 08:29

## 2019-06-25 RX ADMIN — MIDAZOLAM HYDROCHLORIDE 2 MG: 1 INJECTION, SOLUTION INTRAMUSCULAR; INTRAVENOUS at 10:50

## 2019-06-25 RX ADMIN — SODIUM CHLORIDE, SODIUM LACTATE, POTASSIUM CHLORIDE, AND CALCIUM CHLORIDE 75 ML/HR: 600; 310; 30; 20 INJECTION, SOLUTION INTRAVENOUS at 08:24

## 2019-06-25 RX ADMIN — TROPICAMIDE 1 DROP: 10 SOLUTION/ DROPS OPHTHALMIC at 08:19

## 2019-06-25 RX ADMIN — KETOROLAC TROMETHAMINE 1 DROP: 5 SOLUTION/ DROPS OPHTHALMIC at 08:35

## 2019-06-25 RX ADMIN — TROPICAMIDE 1 DROP: 10 SOLUTION/ DROPS OPHTHALMIC at 08:14

## 2019-06-25 RX ADMIN — FENTANYL CITRATE 50 MCG: 50 INJECTION, SOLUTION INTRAMUSCULAR; INTRAVENOUS at 10:34

## 2019-06-25 RX ADMIN — LIDOCAINE HYDROCHLORIDE 2 DROP: 35 GEL OPHTHALMIC at 08:35

## 2019-06-25 RX ADMIN — MIDAZOLAM HYDROCHLORIDE 1 MG: 1 INJECTION, SOLUTION INTRAMUSCULAR; INTRAVENOUS at 10:34

## 2019-06-25 RX ADMIN — PHENYLEPHRINE HYDROCHLORIDE 1 DROP: 2.5 SOLUTION/ DROPS OPHTHALMIC at 08:24

## 2019-06-25 RX ADMIN — KETOROLAC TROMETHAMINE 1 DROP: 5 SOLUTION/ DROPS OPHTHALMIC at 08:14

## 2019-06-25 RX ADMIN — KETOROLAC TROMETHAMINE 1 DROP: 5 SOLUTION/ DROPS OPHTHALMIC at 08:29

## 2019-06-25 RX ADMIN — KETOROLAC TROMETHAMINE 1 DROP: 5 SOLUTION/ DROPS OPHTHALMIC at 08:24

## 2019-06-25 RX ADMIN — KETOROLAC TROMETHAMINE 1 DROP: 5 SOLUTION/ DROPS OPHTHALMIC at 08:19

## 2019-06-25 RX ADMIN — TROPICAMIDE 1 DROP: 10 SOLUTION/ DROPS OPHTHALMIC at 08:35

## 2019-06-25 RX ADMIN — PHENYLEPHRINE HYDROCHLORIDE 1 DROP: 2.5 SOLUTION/ DROPS OPHTHALMIC at 08:35

## 2019-06-25 NOTE — DISCHARGE INSTRUCTIONS
Patient Education        Cataract Surgery: What to Expect at Home  Your Recovery    After surgery, your eye will not hurt. But it may feel scratchy, sticky, or uncomfortable. It may also water more than usual.  Most people see better 1 to 3 days after surgery. But it could take 3 to 10 weeks to get the full benefits of surgery and to see as clearly as possible. Your doctor may send you home with a bandage, patch, or clear shield on your eye. This will keep you from rubbing your eye. Your doctor will also give you eyedrops to help your eye heal. Use them exactly as directed. You can read or watch TV right away, but things may look blurry. Most people are able to return to work or their normal routine in 1 to 3 days. After your eye heals, you may still need to wear glasses, especially for reading. This care sheet gives you a general idea about how long it will take for you to recover. But each person recovers at a different pace. Follow the steps below to get better as quickly as possible. How can you care for yourself at home? Activity    · Rest when you feel tired. Getting enough sleep will help you recover.     · You may have trouble judging distances for a few days. Move slowly, and be careful going up and down stairs and pouring hot liquids. Ask for help if you need it.     · Ask your doctor when it is okay to drive.     · Wear your eye bandage, patch, or shield for as long as your doctor recommends. You may only need to wear it when you sleep.     · You can shower or wash your hair the day after surgery. Keep water, soap, shampoo, hair spray, and shaving lotion out of your eye, especially for the first week.     · Do not rub or put pressure on your eye for at least 1 week.     · Do not wear eye makeup for 1 to 2 weeks.  You may also want to avoid face cream or lotion.     · Do not get your hair colored or permed for 10 days after surgery.     · Do not bend over or do any strenuous activities, such as biking, jogging, weight lifting, or aerobic exercise, for 2 weeks or until your doctor says it is okay.     · Avoid swimming, hot tubs, gardening, and dusting for 1 to 2 weeks.     · Wear sunglasses on bright days for at least 1 year after surgery. Medicines    · Your doctor will tell you if and when you can restart your medicines. He or she will also give you instructions about taking any new medicines.     · If you take blood thinners, such as warfarin (Coumadin), clopidogrel (Plavix), or aspirin, be sure to talk to your doctor. He or she will tell you if and when to start taking those medicines again. Make sure that you understand exactly what your doctor wants you to do.     · Follow your doctor's instructions for when to use your eyedrops. Always wash your hands before you put your drops in. To put in eyedrops:  ? Tilt your head back, and pull your lower eyelid down with one finger. ? Drop or squirt the medicine inside the lower lid. ? Close your eye for 30 to 60 seconds to let the drops or ointment move around. ? Do not touch the ointment or dropper tip to your eyelashes or any other surface.     · Follow your doctor's instructions for taking pain medicines. Follow-up care is a key part of your treatment and safety. Be sure to make and go to all appointments, and call your doctor if you are having problems. It's also a good idea to know your test results and keep a list of the medicines you take. When should you call for help? Call 911 anytime you think you may need emergency care. For example, call if:    · You passed out (lost consciousness).     · You have a sudden loss of vision.     · You have sudden chest pain, are short of breath, or cough up blood.    Call your doctor now or seek immediate medical care if:    · You have signs of an eye infection, such as:  ? Pus or thick discharge coming from the eye.  ? Redness or swelling around the eye.  ?  A fever.     · You have new or worse eye pain.     · You have vision changes.     · You have symptoms of a blood clot in your leg (called a deep vein thrombosis), such as:  ? Pain in the calf, back of the knee, thigh, or groin. ? Redness and swelling in your leg or groin.    Watch closely for changes in your health, and be sure to contact your doctor if:    · You do not get better as expected. Where can you learn more? Go to http://rj-raquel.info/. Enter R255 in the search box to learn more about \"Cataract Surgery: What to Expect at Home. \"  Current as of: July 17, 2018  Content Version: 11.9  © 0683-1519 [employer]. Care instructions adapted under license by [employer] (which disclaims liability or warranty for this information). If you have questions about a medical condition or this instruction, always ask your healthcare professional. [place] [address] any warranty or liability for your use of this information. DISCHARGE SUMMARY from Nurse    PATIENT INSTRUCTIONS:    After general anesthesia or intravenous sedation, for 24 hours or while taking prescription Narcotics:  · Limit your activities  · Do not drive and operate hazardous machinery  · Do not make important personal or business decisions  · Do  not drink alcoholic beverages  · If you have not urinated within 8 hours after discharge, please contact your surgeon on call.     Report the following to your surgeon:  · Excessive pain, swelling, redness or odor of or around the surgical area  · Temperature over 100.5  · Nausea and vomiting lasting longer than 4 hours or if unable to take medications  · Any signs of decreased circulation or nerve impairment to extremity: change in color, persistent  numbness, tingling, coldness or increase pain  · Any questions    What to do at Home:  Recommended activity: Activity as tolerated, Ambulate in house and No heavy lifting, pushing, pulling    If you experience any of the following symptoms above, please follow up with Dr. Ana Villatoro. *  Please give a list of your current medications to your Primary Care Provider. *  Please update this list whenever your medications are discontinued, doses are      changed, or new medications (including over-the-counter products) are added. *  Please carry medication information at all times in case of emergency situations. These are general instructions for a healthy lifestyle:    No smoking/ No tobacco products/ Avoid exposure to second hand smoke  Surgeon General's Warning:  Quitting smoking now greatly reduces serious risk to your health. Obesity, smoking, and sedentary lifestyle greatly increases your risk for illness    A healthy diet, regular physical exercise & weight monitoring are important for maintaining a healthy lifestyle    You may be retaining fluid if you have a history of heart failure or if you experience any of the following symptoms:  Weight gain of 3 pounds or more overnight or 5 pounds in a week, increased swelling in our hands or feet or shortness of breath while lying flat in bed. Please call your doctor as soon as you notice any of these symptoms; do not wait until your next office visit. Patient armband removed and shredded      The discharge information has been reviewed with the patient and caregiver. The patient and caregiver verbalized understanding. Discharge medications reviewed with the patient and caregiver and appropriate educational materials and side effects teaching were provided.   ___________________________________________________________________________________________________________________________________

## 2019-06-25 NOTE — PROGRESS NOTES
Daily Progress Note      Review of Systems      Physical Exam     There were no changes in H and P, 06/25/2019

## 2019-06-25 NOTE — ANESTHESIA PREPROCEDURE EVALUATION
Relevant Problems   No relevant active problems       Anesthetic History   No history of anesthetic complications            Review of Systems / Medical History  Patient summary reviewed, nursing notes reviewed and pertinent labs reviewed    Pulmonary  Within defined limits                 Neuro/Psych   Within defined limits           Cardiovascular    Hypertension                   GI/Hepatic/Renal  Within defined limits              Endo/Other      Hypothyroidism  Morbid obesity and arthritis     Other Findings              Physical Exam    Airway  Mallampati: II  TM Distance: 4 - 6 cm  Neck ROM: normal range of motion   Mouth opening: Normal     Cardiovascular    Rhythm: regular  Rate: normal         Dental    Dentition: Full upper dentures     Pulmonary  Breath sounds clear to auscultation               Abdominal  GI exam deferred       Other Findings            Anesthetic Plan    ASA: 3  Anesthesia type: MAC          Induction: Intravenous  Anesthetic plan and risks discussed with: Patient and Family

## 2019-06-25 NOTE — PERIOP NOTES
Reviewed PTA medication list with patient/caregiver and patient/caregiver denies any additional medications. Patient admits to having a responsible adult care for them for at least 24 hours after surgery.     Dual skin assessment completed by Arsh BUSTOS and Shira Flower RN. Pt denies having an active cough and confirms being able to lie still for 20 minutes.

## 2019-06-25 NOTE — ANESTHESIA POSTPROCEDURE EVALUATION
Post-Anesthesia Evaluation and Assessment    Cardiovascular Function/Vital Signs  Visit Vitals  /56   Pulse 85   Temp 36.1 °C (97 °F)   Resp 12   Ht 5' 6.5\" (1.689 m)   Wt 139.3 kg (307 lb)   SpO2 100%   BMI 48.81 kg/m²       Patient is status post Procedure(s):  CATARACT EXTRACTION WITH INTRA OCULAR LENS IMPLANT, LIMBAL RELAXING INCISION - LEFT EYE. Nausea/Vomiting: Controlled. Postoperative hydration reviewed and adequate. Pain:  Pain Scale 1: Numeric (0 - 10) (06/25/19 0800)  Pain Intensity 1: 0 (06/25/19 0800)   Managed. Neurological Status:   Neuro (WDL): Within Defined Limits (06/25/19 0819)   At baseline. Mental Status and Level of Consciousness: Arousable. Pulmonary Status:   O2 Device: Room air (06/25/19 1104)   Adequate oxygenation and airway patent. Complications related to anesthesia: None    Post-anesthesia assessment completed. No concerns. Patient has met all discharge requirements.     Signed By: Brianna Fernandez CRNA    June 25, 2019

## 2019-06-25 NOTE — OP NOTES
Cataract Operative Note      Patient: Chana Tavares               Sex: female          DOA: 6/25/2019         YOB: 1955      Age:  59 y.o.        LOS:  LOS: 0 days     Preoperative Diagnosis: Cataract left eye    Postoperative Diagnosis:  Cataract  left eye  Surgeon: Nick Rice M.D. Anesthesia:  Topical anesthesia  Procedure:  Phacoemulsification of posterior chamber for intraocular lens implantation left    Fluids:  0    Procedure in Detail: The operative eye was prepped and draped in the usual fashion. A lid speculum was placed in the operative eye. A clear cornea approach was utilized. A paracentesis incision(s) was constructed with a 1 mm slit knife. One percent preservative-free lidocaine followed by viscoelastic was instilled into the anterior chamber. A clear corneal incision was made with a slit knife. A continuous curvilinear capsulorrhexis was constructed followed by hydrodissection. A phacoemulsification tip was placed into the eye, and the lens nucleus was emulsified. The irrigation/aspiration device was then used to remove any remaining cortical material.  Polishing of the capsule was performed as needed. The intraocular lens was then placed into the capsular bag after it was re-inflated with viscoelastic. The remaining viscoelastic was then removed using the irrigation/aspiration device. BSS on a cannula was then used to hydrate the wound edges. At the end of the procedure the wound was found to be watertight, the anterior chamber was deep and the pupil round. No blood loss during surgery. An antibiotic and anti-inflammatroy was placed into the operative eye. The lid speculum was removed. Protective sunglasses were then placed onto the patient. The patient was taken to the [address] [place] [address] Unit (PACU) in good condition having tolerated the procedure well.     Estimated Blood Loss: 0                 Implants: * No implants in log *    Specimens: * No specimens in log *            Complications:  None           Joe Dickens.  Selam Sage M.D.  [unfilled]  10:36 AM

## 2019-07-11 ENCOUNTER — HOSPITAL ENCOUNTER (OUTPATIENT)
Dept: CT IMAGING | Age: 64
Discharge: HOME | End: 2019-07-11
Attending: INTERNAL MEDICINE
Payer: COMMERCIAL

## 2019-07-11 DIAGNOSIS — I26.99 PULMONARY INFARCTION (HCC): ICD-10-CM

## 2019-07-11 DIAGNOSIS — R06.00 DYSPNEA: ICD-10-CM

## 2019-07-11 LAB — CREAT UR-MCNC: 0.9 MG/DL (ref 0.6–1.3)

## 2019-07-11 PROCEDURE — 82565 ASSAY OF CREATININE: CPT

## 2019-07-11 PROCEDURE — 74011636320 HC RX REV CODE- 636/320: Performed by: INTERNAL MEDICINE

## 2019-07-11 PROCEDURE — 71275 CT ANGIOGRAPHY CHEST: CPT

## 2019-07-11 RX ADMIN — IOPAMIDOL 100 ML: 755 INJECTION, SOLUTION INTRAVENOUS at 10:16

## 2019-10-04 RX ORDER — EPINEPHRINE 0.1 MG/ML
1 INJECTION INTRACARDIAC; INTRAVENOUS
Status: CANCELLED | OUTPATIENT
Start: 2019-10-04 | End: 2019-10-05

## 2019-10-04 RX ORDER — ATROPINE SULFATE 0.1 MG/ML
0.5 INJECTION INTRAVENOUS
Status: CANCELLED | OUTPATIENT
Start: 2019-10-04 | End: 2019-10-05

## 2019-10-04 RX ORDER — DIPHENHYDRAMINE HYDROCHLORIDE 50 MG/ML
50 INJECTION, SOLUTION INTRAMUSCULAR; INTRAVENOUS ONCE
Status: CANCELLED | OUTPATIENT
Start: 2019-10-04 | End: 2019-10-04

## 2019-10-04 RX ORDER — SODIUM CHLORIDE 0.9 % (FLUSH) 0.9 %
5-40 SYRINGE (ML) INJECTION AS NEEDED
Status: CANCELLED | OUTPATIENT
Start: 2019-10-04

## 2019-10-04 RX ORDER — DEXTROMETHORPHAN/PSEUDOEPHED 2.5-7.5/.8
1.2 DROPS ORAL
Status: CANCELLED | OUTPATIENT
Start: 2019-10-04

## 2019-10-04 RX ORDER — SODIUM CHLORIDE 0.9 % (FLUSH) 0.9 %
5-40 SYRINGE (ML) INJECTION EVERY 8 HOURS
Status: CANCELLED | OUTPATIENT
Start: 2019-10-04

## 2019-10-08 NOTE — H&P
This 59year old female presents for Colon Cancer Screening. History of Present Illness:  1. Colon Cancer Screening   Prior screening:  colonoscopy and She[facility] Bal 3 years ago. .found 3 polyps. Denies risk factors. Pertinent negatives include abdominal pain, change in bowel habits, change in stool caliber, constipation, decreased appetite, diarrhea, melena, nausea, rectal bleeding, vomiting, weight gain and weight loss. Additional information: No family history of colon cancer, No family history of Crohn's/colitis, NSAID/ASA use and Xarelto once a day. PROBLEM LIST:   Problem List reviewed. 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 left ankle injury s/p surgical repair with hardware       Pulmonary Embolism. Thyroid disease       Hypertension             Family History  (Detailed)  Relationship Family Member Name  Age at Death Condition Onset Age Cause of Death       Family history of Cancer, breast  N       Family history of COPD  N   Father  Y 79      Mother  Y 76 Hypertension  N         Social History:  (Detailed)  Preferred language is English. The patient does not need an . MARITAL STATUS/FAMILY/SOCIAL SUPPORT  Currently . Smoking status: Never smoker.     SMOKING STATUS  Use Status Type Smoking Status Usage Per Day Years Used Total Pack Years   no/never  Never smoker ALCOHOL  There is a history of alcohol use. Type: Wine. 1 drink consumed socially. Last alcoholic drink was last month. CAFFEINE  The patient uses caffeine: soda - 16 oz a day. Medications (active prior to today)  Medication Instructions Start Date Stop Date Refilled Elsewhere   DILT- mg capsule, extended release take 1 capsule by oral route  every day 03/08/2019   N   lisinopril 40 mg tablet take 1 tablet by oral route  every day 06/03/2019 06/03/2019 N   furosemide 20 mg tablet take 1 tablet by oral route  every day 06/03/2019 06/03/2019 N   Xarelto 20 mg tablet take 1 tablet by oral route  every day with the evening meal 06/03/2019 06/03/2019 N   levothyroxine 200 mcg tablet take 1 tablet by oral route  every day 06/03/2019 06/03/2019 N     Patient Status   Completed with information received for patient in a summary of care record. Medication Reconciliation  Medications reconciled today.   Medication Reviewed  Adherence Medication Name Sig Desc Elsewhere Status   taking as directed furosemide 20 mg tablet take 1 tablet by oral route  every day N Verified   taking as directed lisinopril 40 mg tablet take 1 tablet by oral route  every day N Verified   taking as directed levothyroxine 200 mcg tablet take 1 tablet by oral route  every day N Verified   taking as directed Xarelto 20 mg tablet take 1 tablet by oral route  every day with the evening meal N Verified   taking as directed DILT- mg capsule, extended release take 1 capsule by oral route  every day N Verified     Medications (Added, Continued or Stopped today)  Start Date Medication Directions PRN Status PRN Reason Instruction Stop Date   06/11/2019 cyclobenzaprine 10 mg tablet take 0.5-1 tablet by oral route at bedtime as needed for muscle spasm and help her with sleep N   06/11/2019 03/08/2019 DILT- mg capsule, extended release take 1 capsule by oral route  every day N      06/03/2019 furosemide 20 mg tablet take 1 tablet by oral route  every day N      06/03/2019 levothyroxine 200 mcg tablet take 1 tablet by oral route  every day N      06/03/2019 lisinopril 40 mg tablet take 1 tablet by oral route  every day N      06/11/2019 MoviPrep 100 gram-7.5 gram-2.691 gram oral powder packet take by Oral route to take as directed N      06/11/2019 prednisone 20 mg tablet 3 po x3d, 2 po x 3d, 1 po x 3d N   06/11/2019 06/11/2019 tramadol 50 mg tablet take 1 tablet by oral route  every 8 hours as needed N   06/11/2019 06/03/2019 Xarelto 20 mg tablet take 1 tablet by oral route  every day with the evening meal N        Allergies:  Ingredient Reaction (Severity) Medication Name Comment   NO KNOWN ALLERGIES          ORDERS:  Status Lab Order Time Frame Comments   completed Dietary management education, guidance, and counseling     Review of Systems  System Neg/Pos Details   Constitutional Negative Chills, Fever, Malaise, Weight gain and Weight loss. ENMT Negative Ear infections, Nasal congestion, Sinus Infection and Sore throat. Eyes Negative Double vision and Eye pain. Respiratory Negative Asthma, Chronic cough, Dyspnea, Pleuritic pain and Wheezing. Cardio Negative Chest pain, Edema and Irregular heartbeat/palpitations. GI Positive Hemorrhoids, Hemorrhoids. GI Negative Abdominal pain, Change in bowel habits, Change in stool caliber, Constipation, Decreased appetite, Diarrhea, Dysphagia, Heartburn, Hematemesis, Hematochezia, Melena, Nausea, Rectal bleeding, Reflux and Vomiting.  Negative Dysuria, Hematuria, Urinary frequency, Urinary incontinence and Urinary retention. Endocrine Negative Cold intolerance, Heat intolerance and Increased thirst.   Neuro Negative Dizziness, Headache, Numbness, Tremors and Vertigo. Psych Negative Anxiety, Depression and Increased stress. Integumentary Negative Hives, Pruritus and Rash. MS Positive Joint pain. MS Negative Back pain and Myalgia.    Hema/Lymph Negative Easy bleeding, Easy bruising and Lymphadenopathy. Allergic/Immuno Negative Chemicals in work place, Contact allergy, Food allergies, Immunosuppression and Seasonal allergies. Reproductive Negative Breast lumps, Breast pain and Vaginal discharge. Vital Signs     Height  Time ft in cm Last Measured Height Position   4:26 PM 5.0 6.00 167.64 04/19/2018 Standing     Weight/BSA/BMI  Time lb oz kg Context BMI kg/m2 BSA m2   4:26 .00  141.067 dressed with shoes 50.20 2.56     Blood Pressure  Time BP mm/Hg Position Side Site Method Cuff Size   4:26 /73 sitting right arm automatic adult     Temperature/Pulse/Respiration  Time Temp F Temp C Temp Site Pulse/min Pattern Resp/ min   4:26 PM    75 regular 16     Measured By  Time Measured by   4:26 PM Gemini Shine         PHYSICAL EXAM:  Exam Findings Details   Constitutional Normal No acute distress. Well Nourished. Well developed. Eyes Normal General - Right: Normal, Left: Normal. Conjunctiva - Right: Normal, Left: Normal. Sclera - Right: Normal, Left: Normal. Pupil - Right: Normal, Left: Normal.   Nose/Mouth/Throat Normal Lips/teeth/gums - Normal. Tongue - Normal. Buccal mucosa - Normal. Palate & uvula - Normal.   Neck Exam Normal Inspection - Normal. Palpation - Normal. Thyroid gland - Normal. Submandibular lymph nodes - Normal.   Lymph Detail Normal Submandibular. Anterior cervical. Posterior cervical. Supraclavicular. Respiratory Normal Inspection - Normal. Auscultation - Normal. Percussion - Normal. Cough - Absent. Effort - Normal.   Cardiovascular Normal Heart rate - Regular rate. Heart sounds - Normal S1, Normal S2. Murmurs - None. Extremities - No edema. Abdomen Normal Inspection - Normal. Appliance(s) - None. Abdominal muscles - Normal. Auscultation - Normal. Percussion - Normal. Anterior palpation - Normal, No guarding, No rebound. CVA tenderness - None. Umbilicus - Normal. No abdominal tenderness. No hepatic enlargement. No splenic enlargement. No hernia. No Ascites. No palpable mass. Lockwood's sign - Negative. Skin Normal Inspection - Normal.   Musculoskeletal Normal Hands - Left: Normal, Right: Normal.   Extremity Normal No cyanosis. No edema. Clubbing - Absent. Neurological Normal Level of consciousness - Normal. Orientation - Normal. Memory - Normal. Motor - Normal. Balance & gait - Normal. Coordination - Normal. Fine motor skills - Normal. DTRs - Normal.   Psychiatric Normal Orientation - Oriented to time, place, person & situation. Not anxious. Appropriate mood and affect. Behavior appropriate for age. Sufficient fund of knowledge. Sufficient language. No memory loss. Assessment/Plan  # Detail Type Description    1. Assessment Personal history of colonic polyp (Z86.010). Patient Plan 59year old female patient of Dr. Jasmine Real seen  for colon cancer screening. Last colonoscopy was done 4/13/2016 done by Dr. Dillon Villaseñor impression and pathology revealed long tortuous and redundant colon with severe left colon diverticulosis. 3 polyps in the transverse colon 4,5, and 6mm all hot snared one polyp 6 mm in the ascending colon on retroflexion view cold snared and 2 in the distal ascending colon 5 to 6 mm each all hot snared. Pathology revealed tubular adenoma. Last EGD done 3/14/2018 done by Dr. Dillon Villaseñor impression revealed multiple localized varicose veins in the proximal esophagus at 25 cm. The very distal esophagus appears to be more spastic and slightly stenotic without visible esophagitis. Z line discretely irregular but no Wilkinson's. There is a > 2 cm sliding hiatal hernia. Medium sized periampullary diverticulum. No gastric or distal esophageal varices. BM once daily. Normal color, soft, formed in consistency. No evidence of blood or mucus, changes in bowel pattern or constipation issues. Patient reports no allergies or herbal consumption. Medical hx includes HTN, hypothyroidism, Hx of PE.   No significant cardiac, pulmonary, GI, , musculoskeletal  issues. Surgical hx remarkable for right nephrectomy. No family history of colorectal CA. Denies tobacco and reports occasional ETOH use. No significant weight gains or losses in the last 3-6 months. No heat or cold intolerances. Patient states  no N/V/D, fever, chills, sick contacts, SOB, abdominal or chest pains. No dysphagia, appetite is good which consists of 3 meals per day. PLAN: Colonoscopy Scheduled     She has average risk for colon cancer and is asymptomatic. She would be having her screening colonoscopy. I explained to her the procedure of colonoscopy and the risks involved which include but not limited to reaction to sedation, bleeding, perforation, infection or missing a lesion if bowels are not well prepped or are unusually tortuous. She agreed to proceed with the procedure and answered her questions. I gave her the Ganga Copping to clean her bowels. I advised her to take if needed extra laxatives for few days before in case she is on the constipated side to assure adequate bowel prep. Told her not take her medications in the morning of the procedure because they would be flushed out with the prep but can take them more confidently after the procedure. I advised her to bring all her medication with her.      No  Change in H&P

## 2019-10-09 ENCOUNTER — HOSPITAL ENCOUNTER (OUTPATIENT)
Age: 64
Setting detail: OUTPATIENT SURGERY
Discharge: HOME | End: 2019-10-09
Attending: INTERNAL MEDICINE | Admitting: INTERNAL MEDICINE
Payer: COMMERCIAL

## 2019-10-09 VITALS
HEART RATE: 82 BPM | BODY MASS INDEX: 47.09 KG/M2 | RESPIRATION RATE: 16 BRPM | SYSTOLIC BLOOD PRESSURE: 101 MMHG | DIASTOLIC BLOOD PRESSURE: 59 MMHG | TEMPERATURE: 97 F | WEIGHT: 293 LBS | HEIGHT: 66 IN | OXYGEN SATURATION: 100 %

## 2019-10-09 PROCEDURE — 77030020256 HC SOL INJ NACL 0.9%  500ML: Performed by: INTERNAL MEDICINE

## 2019-10-09 PROCEDURE — 99153 MOD SED SAME PHYS/QHP EA: CPT | Performed by: INTERNAL MEDICINE

## 2019-10-09 PROCEDURE — 74011250636 HC RX REV CODE- 250/636: Performed by: INTERNAL MEDICINE

## 2019-10-09 PROCEDURE — 77030040361 HC SLV COMPR DVT MDII -B: Performed by: INTERNAL MEDICINE

## 2019-10-09 PROCEDURE — 76040000007: Performed by: INTERNAL MEDICINE

## 2019-10-09 PROCEDURE — G0500 MOD SEDAT ENDO SERVICE >5YRS: HCPCS | Performed by: INTERNAL MEDICINE

## 2019-10-09 RX ORDER — NALOXONE HYDROCHLORIDE 0.4 MG/ML
0.4 INJECTION, SOLUTION INTRAMUSCULAR; INTRAVENOUS; SUBCUTANEOUS
Status: DISCONTINUED | OUTPATIENT
Start: 2019-10-09 | End: 2019-10-09 | Stop reason: HOSPADM

## 2019-10-09 RX ORDER — SODIUM CHLORIDE 9 MG/ML
1000 INJECTION, SOLUTION INTRAVENOUS CONTINUOUS
Status: DISCONTINUED | OUTPATIENT
Start: 2019-10-09 | End: 2019-10-09 | Stop reason: HOSPADM

## 2019-10-09 RX ORDER — FENTANYL CITRATE 50 UG/ML
100 INJECTION, SOLUTION INTRAMUSCULAR; INTRAVENOUS
Status: DISCONTINUED | OUTPATIENT
Start: 2019-10-09 | End: 2019-10-09 | Stop reason: HOSPADM

## 2019-10-09 RX ORDER — MIDAZOLAM HYDROCHLORIDE 1 MG/ML
.25-5 INJECTION, SOLUTION INTRAMUSCULAR; INTRAVENOUS
Status: DISCONTINUED | OUTPATIENT
Start: 2019-10-09 | End: 2019-10-09 | Stop reason: HOSPADM

## 2019-10-09 RX ORDER — FLUMAZENIL 0.1 MG/ML
0.2 INJECTION INTRAVENOUS
Status: DISCONTINUED | OUTPATIENT
Start: 2019-10-09 | End: 2019-10-09 | Stop reason: HOSPADM

## 2019-10-09 RX ADMIN — SODIUM CHLORIDE 1000 ML: 900 INJECTION, SOLUTION INTRAVENOUS at 10:33

## 2019-10-09 NOTE — DISCHARGE INSTRUCTIONS
Issac Cavazos  [id number]  1955    COLON DISCHARGE INSTRUCTIONS    Discomfort:  Redness at IV site- apply warm compress to area; if redness or soreness persist- contact your physician  There may be a slight amount of blood passed from the rectum  Gaseous discomfort- walking, belching will help relieve any discomfort  You may not operate a vehicle til the next day. You may not engage in an occupation involving machinery or appliances til the next day. You may not drink alcoholic beverages til the next day. DIET:   High fiber diet. ACTIVITY:  You may not  resume your normal daily activities til the next day. it is recommended that you spend the remainder of the day resting -  avoid any strenuous activity. CALL M.D.  IF ANY SIGN OF:   Increasing pain, nausea, vomiting  Abdominal distension (swelling)  New increased bleeding (oral or rectal)  Fever (chills)  Pain in chest area  Bloody discharge from nose or mouth  Shortness of breath    You may not  take any Advil, Aspirin, Ibuprofen, Motrin, Aleve, or Goodys  ONLY  Tylenol as needed for pain. Resume the [facility] tomorrow evening    Post procedure diagnosis:  sigmoid diverticulosis, POLYP IN AC,  POLYP IN TC, HEMORROID     Follow-up Instructions: Your follow up colonoscopy will be in 5 years. We will notify you the results of your biopsy by letter within 2 weeks. Milad Gillespie MD  October 9, 2019       DISCHARGE SUMMARY from Nurse    PATIENT INSTRUCTIONS:    After general anesthesia or intravenous sedation, for 24 hours or while taking prescription Narcotics:  · Limit your activities  · Do not drive and operate hazardous machinery  · Do not make important personal or business decisions  · Do  not drink alcoholic beverages  · If you have not urinated within 8 hours after discharge, please contact your surgeon on call.     Report the following to your surgeon:  · Excessive pain, swelling, redness or odor of or around the surgical area  · Temperature over 100.5  · Nausea and vomiting lasting longer than 4 hours or if unable to take medications  · Any signs of decreased circulation or nerve impairment to extremity: change in color, persistent  numbness, tingling, coldness or increase pain  · Any questions    What to do at Home:  Recommended activity: as above,     If you experience any of the following symptoms as above, please follow up with Dr. Trisha Oliveira. *  Please give a list of your current medications to your Primary Care Provider. *  Please update this list whenever your medications are discontinued, doses are      changed, or new medications (including over-the-counter products) are added. *  Please carry medication information at all times in case of emergency situations. These are general instructions for a healthy lifestyle:    No smoking/ No tobacco products/ Avoid exposure to second hand smoke  Surgeon General's Warning:  Quitting smoking now greatly reduces serious risk to your health. Obesity, smoking, and sedentary lifestyle greatly increases your risk for illness    A healthy diet, regular physical exercise & weight monitoring are important for maintaining a healthy lifestyle    You may be retaining fluid if you have a history of heart failure or if you experience any of the following symptoms:  Weight gain of 3 pounds or more overnight or 5 pounds in a week, increased swelling in our hands or feet or shortness of breath while lying flat in bed. Please call your doctor as soon as you notice any of these symptoms; do not wait until your next office visit. The discharge information has been reviewed with the patient. The patient verbalized understanding.   Discharge medications reviewed with the patient and appropriate educational materials and side effects teaching were provided.   ___________________________________________________________________________________________________________________________________  Patient armband removed and shredded

## 2019-10-09 NOTE — PROCEDURES
[facility]  Colonoscopy Procedure Report  _______________________________________________________  Patient: Wannetta Hamman                                         Attending Physician: Roxie Shane MD    Patient ID: [id number]                                      Referring Physician: Candace Arcos MD    Exam Date: October 9, 2019 _______________________________________________________      Introduction: A  59 y.o. female patient, presents for outpatient Colonoscopy    Indications: patient of Dr. Mitchell Murillo seen  for colon cancer screening. Last colonoscopy on 4/13/2016 by my self:  long tortuous and redundant colon with severe left colon diverticulosis. 3 polyps in the transverse colon 4,5, and 6mm all hot snared one polyp 6 mm in the ascending colon on retroflexion view cold snared and 2 in the distal ascending colon 5 to 6 mm each all hot snared. Pathology revealed tubular adenoma. Last EGD done 3/14/2018 done by Dr. Estrellita Phan impression revealed multiple localized varicose veins in the proximal esophagus at 25 cm. The very distal esophagus appears to be more spastic and slightly stenotic without visible esophagitis. Z line discretely irregular but no Wilkinson's. There is a > 2 cm sliding hiatal hernia. Medium sized periampullary diverticulum. No gastric or distal esophageal varices. BM once daily. Medical hx includes HTN, hypothyroidism, Hx of PE. Surgical hx remarkable for right nephrectomy for renal cell cancer 10 years ago. No family history of colorectal CA. Consent: The benefits, risks, and alternatives to the procedure were discussed and informed consent was obtained from the patient. Preparation: EKG, pulse, pulse oximetry and blood pressure were monitored throughout the procedure. ASA Classification: Class 2 - . The heart is an S1-S2 and regular heart rate and rhythm. Lungs are clear to auscultation and percussion. Abdomen is soft, nondistended, and nontender.  Mental Status: awake, alert, and oriented to person, place, and time    Medications:  · Fentanyl 100 mcg IV before procedure. · Versed 5 mg IV throughout the procedure. Rectal Exam: Normal Rectal Exam. No Blood. Pathology Specimens: Two specimens removed. Procedure: The colonoscope was passed with difficulty through the anus under direct visualization and advanced to the cecum and 5 cm inside the terminal ileum. The patient required positioning on the back to aid in the passage of the scope. The scope was withdrawn and the mucosa was carefully examined. The quality of the preparation was very good. The views were excellent. The patient's toleration of the procedure was excellent. Retroflexion was preformed in the ascending colon and hepatic flexure. Total time is minutes and withdrawal 22 time is 14 minutes. Findings:    Rectum:   Small internal hemorrhoids  Sigmoid:   Long tortuous and redundant sigmoid colon. Severe sigmoid diverticulosis with muscular hypertonia. One small asymptomatic diverticulitis at 30 cm   Descending Colon: Moderate descending colon diverticulosis  Transverse Colon:   7 mm sessile polyp in the proximal transverse colon cold snared  Ascending Colon:   4 mm sessile polyp in the distal ascending colon, cold snared. Cecum:   Normal  Terminal Ileum:   Normal      Unplanned Events: There were no unplanned events. Estimated Blood Loss: None  Impressions:    Small internal hemorrhoids. Long tortuous and redundant sigmoid colon. Severe sigmoid diverticulosis with muscular hypertonia. One small asymptomatic diverticulitis at 30 cm. Moderate descending colon diverticulosis. 7 mm sessile polyp in the proximal transverse colon cold snared. 4 mm sessile polyp in the distal ascending colon, cold snared. Complications: None; patient tolerated the procedure well. Recommendations:  · Discharge home when standard parameters are met. · Resume a high fiber diet.   · Colonoscopy recommendation in 5 years.    Procedure Codes:    · Floyd Mayes [[id number]]    Endoscope Information:  Model Number(s)    [id number]   Assistant: None  Signed By: Tamera Young MD Date: October 9, 2019

## 2021-07-07 ENCOUNTER — HOSPITAL ENCOUNTER (OUTPATIENT)
Dept: LAB | Age: 66
Discharge: HOME | End: 2021-07-07
Payer: MEDICARE

## 2021-07-07 LAB
APPEARANCE UR: CLEAR
APTT PPP: 31 SEC (ref 23–36.4)
BILIRUB UR QL: NEGATIVE
COLOR UR: YELLOW
EST. AVERAGE GLUCOSE BLD GHB EST-MCNC: 108 MG/DL
GLUCOSE UR STRIP.AUTO-MCNC: NEGATIVE MG/DL
HBA1C MFR BLD: 5.4 % (ref 4.2–5.6)
HGB UR QL STRIP: NEGATIVE
INR PPP: 1.6 (ref 0.8–1.2)
KETONES UR QL STRIP.AUTO: NEGATIVE MG/DL
LEUKOCYTE ESTERASE UR QL STRIP.AUTO: NEGATIVE
NITRITE UR QL STRIP.AUTO: NEGATIVE
PH UR STRIP: 5.5 [PH] (ref 5–8)
PROT UR STRIP-MCNC: NEGATIVE MG/DL
PROTHROMBIN TIME: 18.3 SEC (ref 11.5–15.2)
SP GR UR REFRACTOMETRY: 1.01 (ref 1–1.03)
UROBILINOGEN UR QL STRIP.AUTO: 0.2 EU/DL (ref 0.2–1)

## 2021-07-07 PROCEDURE — 83036 HEMOGLOBIN GLYCOSYLATED A1C: CPT

## 2021-07-07 PROCEDURE — 85610 PROTHROMBIN TIME: CPT

## 2021-07-07 PROCEDURE — 36415 COLL VENOUS BLD VENIPUNCTURE: CPT

## 2021-07-07 PROCEDURE — 85730 THROMBOPLASTIN TIME PARTIAL: CPT

## 2021-07-07 PROCEDURE — 81003 URINALYSIS AUTO W/O SCOPE: CPT

## 2021-07-08 LAB
BACTERIA SPEC CULT: NORMAL
BACTERIA SPEC CULT: NORMAL
SERVICE CMNT-IMP: NORMAL

## 2022-03-18 PROBLEM — I10 ESSENTIAL HYPERTENSION, BENIGN: Status: ACTIVE | Noted: 2017-08-19

## 2022-03-19 PROBLEM — E03.9 ACQUIRED HYPOTHYROIDISM: Status: ACTIVE | Noted: 2017-08-19

## 2022-03-19 PROBLEM — I26.92 ACUTE SADDLE PULMONARY EMBOLISM WITHOUT ACUTE COR PULMONALE (HCC): Status: ACTIVE | Noted: 2017-08-19

## 2022-03-19 PROBLEM — I80.9 SUPERFICIAL THROMBOPHLEBITIS: Status: ACTIVE | Noted: 2017-08-19

## 2023-05-16 RX ORDER — CYCLOBENZAPRINE HCL 5 MG
5 TABLET ORAL NIGHTLY PRN
COMMUNITY

## 2023-05-16 RX ORDER — TRIAMCINOLONE ACETONIDE 1 MG/G
CREAM TOPICAL 2 TIMES DAILY
COMMUNITY

## 2023-05-16 RX ORDER — OXYCODONE HYDROCHLORIDE AND ACETAMINOPHEN 5; 325 MG/1; MG/1
1 TABLET ORAL EVERY 6 HOURS PRN
Status: ON HOLD | COMMUNITY
End: 2023-05-17 | Stop reason: HOSPADM

## 2023-05-16 RX ORDER — NITROFURANTOIN 25; 75 MG/1; MG/1
100 CAPSULE ORAL 2 TIMES DAILY
Status: ON HOLD | COMMUNITY
End: 2023-05-17 | Stop reason: HOSPADM

## 2023-05-16 RX ORDER — TIZANIDINE 4 MG/1
4 TABLET ORAL EVERY 6 HOURS PRN
Status: ON HOLD | COMMUNITY
End: 2023-05-17 | Stop reason: HOSPADM

## 2023-05-16 RX ORDER — METHYLPREDNISOLONE 4 MG/1
2 TABLET ORAL DAILY
Status: ON HOLD | COMMUNITY
End: 2023-05-17 | Stop reason: HOSPADM

## 2023-05-16 RX ORDER — LOSARTAN POTASSIUM 50 MG/1
50 TABLET ORAL DAILY
COMMUNITY

## 2023-05-17 ENCOUNTER — HOSPITAL ENCOUNTER (OUTPATIENT)
Facility: HOSPITAL | Age: 68
Setting detail: OUTPATIENT SURGERY
Discharge: HOME | End: 2023-05-17
Attending: INTERNAL MEDICINE | Admitting: INTERNAL MEDICINE
Payer: MEDICARE

## 2023-05-17 VITALS
OXYGEN SATURATION: 97 % | DIASTOLIC BLOOD PRESSURE: 66 MMHG | HEART RATE: 71 BPM | WEIGHT: 293 LBS | HEIGHT: 66 IN | BODY MASS INDEX: 47.09 KG/M2 | SYSTOLIC BLOOD PRESSURE: 130 MMHG | TEMPERATURE: 98 F | RESPIRATION RATE: 22 BRPM

## 2023-05-17 DIAGNOSIS — R06.09 DYSPNEA ON EXERTION: ICD-10-CM

## 2023-05-17 DIAGNOSIS — J81.1 PULMONARY EDEMA: ICD-10-CM

## 2023-05-17 LAB
ANION GAP SERPL CALC-SCNC: 4 MMOL/L (ref 3–18)
BASOPHILS # BLD: 0 K/UL (ref 0–0.1)
BASOPHILS NFR BLD: 1 % (ref 0–2)
BUN SERPL-MCNC: 22 MG/DL (ref 7–18)
BUN/CREAT SERPL: 26 (ref 12–20)
CALCIUM SERPL-MCNC: 8.3 MG/DL (ref 8.5–10.1)
CHLORIDE SERPL-SCNC: 109 MMOL/L (ref 100–111)
CHOLEST SERPL-MCNC: 121 MG/DL
CO2 SERPL-SCNC: 27 MMOL/L (ref 21–32)
CREAT SERPL-MCNC: 0.86 MG/DL (ref 0.6–1.3)
DIFFERENTIAL METHOD BLD: ABNORMAL
EKG ATRIAL RATE: 77 BPM
EKG DIAGNOSIS: NORMAL
EKG P AXIS: 48 DEGREES
EKG P-R INTERVAL: 158 MS
EKG Q-T INTERVAL: 382 MS
EKG QRS DURATION: 100 MS
EKG QTC CALCULATION (BAZETT): 432 MS
EKG R AXIS: -20 DEGREES
EKG T AXIS: 25 DEGREES
EKG VENTRICULAR RATE: 77 BPM
EOSINOPHIL # BLD: 0.2 K/UL (ref 0–0.4)
EOSINOPHIL NFR BLD: 3 % (ref 0–5)
ERYTHROCYTE [DISTWIDTH] IN BLOOD BY AUTOMATED COUNT: 13.4 % (ref 11.6–14.5)
GLUCOSE SERPL-MCNC: 92 MG/DL (ref 74–99)
HCT VFR BLD AUTO: 38.4 % (ref 35–45)
HDLC SERPL-MCNC: 39 MG/DL (ref 40–60)
HDLC SERPL: 3.1 (ref 0–5)
HGB BLD-MCNC: 12.5 G/DL (ref 12–16)
IMM GRANULOCYTES # BLD AUTO: 0 K/UL (ref 0–0.04)
IMM GRANULOCYTES NFR BLD AUTO: 1 % (ref 0–0.5)
INR PPP: 1 (ref 0.8–1.2)
LDLC SERPL CALC-MCNC: 64.8 MG/DL (ref 0–100)
LIPID PANEL: ABNORMAL
LYMPHOCYTES # BLD: 2.1 K/UL (ref 0.9–3.6)
LYMPHOCYTES NFR BLD: 33 % (ref 21–52)
MAGNESIUM SERPL-MCNC: 1.9 MG/DL (ref 1.6–2.6)
MCH RBC QN AUTO: 31.7 PG (ref 24–34)
MCHC RBC AUTO-ENTMCNC: 32.6 G/DL (ref 31–37)
MCV RBC AUTO: 97.5 FL (ref 78–100)
MONOCYTES # BLD: 0.4 K/UL (ref 0.05–1.2)
MONOCYTES NFR BLD: 6 % (ref 3–10)
NEUTS SEG # BLD: 3.7 K/UL (ref 1.8–8)
NEUTS SEG NFR BLD: 57 % (ref 40–73)
NRBC # BLD: 0 K/UL (ref 0–0.01)
NRBC BLD-RTO: 0 PER 100 WBC
PLATELET # BLD AUTO: 164 K/UL (ref 135–420)
PMV BLD AUTO: 9.9 FL (ref 9.2–11.8)
POTASSIUM SERPL-SCNC: 4.1 MMOL/L (ref 3.5–5.5)
PROTHROMBIN TIME: 14 SEC (ref 11.5–15.2)
RBC # BLD AUTO: 3.94 M/UL (ref 4.2–5.3)
SODIUM SERPL-SCNC: 140 MMOL/L (ref 136–145)
TRIGL SERPL-MCNC: 86 MG/DL
VLDLC SERPL CALC-MCNC: 17.2 MG/DL
WBC # BLD AUTO: 6.5 K/UL (ref 4.6–13.2)

## 2023-05-17 PROCEDURE — 80061 LIPID PANEL: CPT

## 2023-05-17 PROCEDURE — 93005 ELECTROCARDIOGRAM TRACING: CPT | Performed by: INTERNAL MEDICINE

## 2023-05-17 PROCEDURE — 99152 MOD SED SAME PHYS/QHP 5/>YRS: CPT | Performed by: INTERNAL MEDICINE

## 2023-05-17 PROCEDURE — C1769 GUIDE WIRE: HCPCS | Performed by: INTERNAL MEDICINE

## 2023-05-17 PROCEDURE — 6360000002 HC RX W HCPCS: Performed by: INTERNAL MEDICINE

## 2023-05-17 PROCEDURE — C1894 INTRO/SHEATH, NON-LASER: HCPCS | Performed by: INTERNAL MEDICINE

## 2023-05-17 PROCEDURE — 85610 PROTHROMBIN TIME: CPT

## 2023-05-17 PROCEDURE — 2580000003 HC RX 258: Performed by: INTERNAL MEDICINE

## 2023-05-17 PROCEDURE — 2500000003 HC RX 250 WO HCPCS: Performed by: INTERNAL MEDICINE

## 2023-05-17 PROCEDURE — 6360000004 HC RX CONTRAST MEDICATION: Performed by: INTERNAL MEDICINE

## 2023-05-17 PROCEDURE — 99153 MOD SED SAME PHYS/QHP EA: CPT | Performed by: INTERNAL MEDICINE

## 2023-05-17 PROCEDURE — 80048 BASIC METABOLIC PNL TOTAL CA: CPT

## 2023-05-17 PROCEDURE — 6370000000 HC RX 637 (ALT 250 FOR IP): Performed by: INTERNAL MEDICINE

## 2023-05-17 PROCEDURE — 93460 R&L HRT ART/VENTRICLE ANGIO: CPT | Performed by: INTERNAL MEDICINE

## 2023-05-17 PROCEDURE — 85025 COMPLETE CBC W/AUTO DIFF WBC: CPT

## 2023-05-17 PROCEDURE — 2709999900 HC NON-CHARGEABLE SUPPLY: Performed by: INTERNAL MEDICINE

## 2023-05-17 PROCEDURE — 83735 ASSAY OF MAGNESIUM: CPT

## 2023-05-17 RX ORDER — SODIUM CHLORIDE 9 MG/ML
INJECTION, SOLUTION INTRAVENOUS CONTINUOUS
Status: DISCONTINUED | OUTPATIENT
Start: 2023-05-17 | End: 2023-05-17 | Stop reason: HOSPADM

## 2023-05-17 RX ORDER — SODIUM CHLORIDE 9 MG/ML
INJECTION, SOLUTION INTRAVENOUS PRN
Status: DISCONTINUED | OUTPATIENT
Start: 2023-05-17 | End: 2023-05-17 | Stop reason: HOSPADM

## 2023-05-17 RX ORDER — HEPARIN SODIUM 200 [USP'U]/100ML
INJECTION, SOLUTION INTRAVENOUS CONTINUOUS PRN
Status: COMPLETED | OUTPATIENT
Start: 2023-05-17 | End: 2023-05-17

## 2023-05-17 RX ORDER — MIDAZOLAM HYDROCHLORIDE 1 MG/ML
INJECTION INTRAMUSCULAR; INTRAVENOUS PRN
Status: DISCONTINUED | OUTPATIENT
Start: 2023-05-17 | End: 2023-05-17 | Stop reason: HOSPADM

## 2023-05-17 RX ORDER — ACETAMINOPHEN 325 MG/1
650 TABLET ORAL EVERY 4 HOURS PRN
Status: DISCONTINUED | OUTPATIENT
Start: 2023-05-17 | End: 2023-05-17 | Stop reason: HOSPADM

## 2023-05-17 RX ORDER — VERAPAMIL HYDROCHLORIDE 2.5 MG/ML
INJECTION, SOLUTION INTRAVENOUS PRN
Status: DISCONTINUED | OUTPATIENT
Start: 2023-05-17 | End: 2023-05-17 | Stop reason: HOSPADM

## 2023-05-17 RX ORDER — BACILLUS COAGULANS/INULIN 1B-250 MG
1 CAPSULE ORAL DAILY
COMMUNITY

## 2023-05-17 RX ORDER — SODIUM CHLORIDE 0.9 % (FLUSH) 0.9 %
5-40 SYRINGE (ML) INJECTION EVERY 12 HOURS SCHEDULED
Status: DISCONTINUED | OUTPATIENT
Start: 2023-05-17 | End: 2023-05-17 | Stop reason: HOSPADM

## 2023-05-17 RX ORDER — HEPARIN SODIUM 1000 [USP'U]/ML
INJECTION, SOLUTION INTRAVENOUS; SUBCUTANEOUS PRN
Status: DISCONTINUED | OUTPATIENT
Start: 2023-05-17 | End: 2023-05-17 | Stop reason: HOSPADM

## 2023-05-17 RX ORDER — LIDOCAINE HYDROCHLORIDE 10 MG/ML
INJECTION, SOLUTION INFILTRATION; PERINEURAL PRN
Status: DISCONTINUED | OUTPATIENT
Start: 2023-05-17 | End: 2023-05-17 | Stop reason: HOSPADM

## 2023-05-17 RX ORDER — ASPIRIN 81 MG/1
81 TABLET, CHEWABLE ORAL ONCE
Status: COMPLETED | OUTPATIENT
Start: 2023-05-17 | End: 2023-05-17

## 2023-05-17 RX ORDER — SODIUM CHLORIDE 0.9 % (FLUSH) 0.9 %
5-40 SYRINGE (ML) INJECTION PRN
Status: DISCONTINUED | OUTPATIENT
Start: 2023-05-17 | End: 2023-05-17 | Stop reason: HOSPADM

## 2023-05-17 RX ADMIN — ASPIRIN 81 MG: 81 TABLET, CHEWABLE ORAL at 09:10

## 2023-05-17 RX ADMIN — SODIUM CHLORIDE: 9 INJECTION, SOLUTION INTRAVENOUS at 09:10

## 2023-05-17 NOTE — BRIEF OP NOTE
Brief Postoperative Note      Patient: Angelica Christopher  YOB: 1955  MRN: [id number]    Date of Procedure: 5/17/2023    Pre-Op Diagnosis Codes: * Dyspnea on exertion [R06.09]     * Pulmonary edema [J81.1]    Post-Op Diagnosis: Same       Procedure(s):  Left and right heart cath / coronary angiography    Surgeon(s):  Brooklynn Richards MD    Assistant:  * No surgical staff found *    Anesthesia: IV Sedation    Estimated Blood Loss (mL): less than 50     Complications: None    Specimens:   * No specimens in log *    Implants:  * No implants in log *      Drains: * No LDAs found *    Findings: Patent coronary arteries with minimal luminal irregularities. Stable right-sided pressure. Findings reviewed with patient and . Complete report to follow.   Thanks      Electronically signed by Dominique Crouch MD on 5/17/2023 at 11:28 AM

## 2023-05-17 NOTE — PRE SEDATION
Sedation Plan  ASA: class 1 - normal, healthy patient     Mallampati class: I - soft palate, uvula, fauces, pillars visible. Sedation plan: local anesthesia and level 2-1: moderate/analgesia (conscious sedation)    Risks, benefits, and alternatives discussed with patient and spouse. Immediate reassessment prior to sedation:  Patient's status reviewed and vital signs assessed; acceptable to perform procedure and proceed to administer sedation as planned.

## 2023-05-17 NOTE — DISCHARGE INSTRUCTIONS
HEART CATHETERIZATION/ANGIOGRAPHY DISCHARGE INSTRUCTIONS    Check puncture site frequently for swelling or bleeding. If there is any bleeding, lie down and apply pressure over the area with a clean towel or washcloth. Notify your doctor for any redness, swelling, drainage, or oozing from the puncture site. Notify your doctor for any fever or chills. If the extremity becomes cold, numb, or painful call Dr. Aide Costello  Activity should be limited for the next 48 hours. Climb stairs as little as possible and avoid any stooping, bending, or strenuous activity for 48 hours. No heavy lifting (anything over 10 pounds) for 1 week. You may resume your usual diet. Drink more fluids than usual.  Have a responsible person drive you home and stay with you for at least 24 hours after your heart catheterization/angiography. Wear armboard 24 hours then remove. You may remove bandage from your Right and Arm in 24 hours. You may shower in 24 hours. No tub baths, hot tubs, or swimming for 1 week. Do not place any lotions, creams, powders, or ointments over puncture site for 1 week. You may place a clean band-aid over the puncture site each day for 5 days. Change daily. I have read the above instructions and have had the opportunity to ask questions.       Patient: ________________________   Date: 5/17/2023    Witness: _______________________   Date: 5/17/2023  Patient armband removed and shredded

## 2023-05-17 NOTE — PROGRESS NOTES
Prepped & ready for procedure. 1125 Back from cath lab, TR band intact to right radial, no bleeding or swelling. Armboard in use. C/o of sl nausea, saltines given with ginger ale. Left brachial dressing with small amount of blood on it, dressing changed,no bleeding noted.  at bedside. 1140 Nausea relieved. 1328 TR band released, sterile 2x2 & tegaderm applied, Armboard in use. No bleeding or swelling. 1345 Asssited up to bathroom, voided. Back to bed.   1500 Dr. Rueda  in.   1513 Discharged home via w/c in stable condition in care of . Denies pain.  Dressings intact & dry to right radial, & left brachial.

## 2023-05-17 NOTE — INTERVAL H&P NOTE
Update History & Physical    The patient's History and Physical of May 17, 2023 was reviewed with the patient and I examined the patient. There was no change. The surgical site was confirmed by the patient and me. Plan: The risks, benefits, expected outcome, and alternative to the recommended procedure have been discussed with the patient. Patient understands and wants to proceed with the procedure.      Electronically signed by Reggie Steiner MD on 5/17/2023 at 10:14 AM

## 2023-05-17 NOTE — POST SEDATION
Sedation Post Procedure Note    Patient Name: Whitney John   YOB: 1955  Room/Bed: [facility]/  Medical Record Number: [id number]  Date: 5/17/2023   Time: 11:29 AM         Physicians/Assistants: Tono Wood MD, MD    Procedure Performed:  Right heart catheterization, left heart catheterization, coronary angiogram.    Post-Sedation Vital Signs:  Vitals:    05/17/23 1118   BP: (!) 176/77   Pulse: 73   Resp: 15   Temp:    SpO2: 96%      Vital signs were reviewed and were stable after the procedure (see flow sheet for vitals)            Post-Sedation Exam: Lungs: clear to auscultation bilaterally without crackles or wheezing and Cardiovascular: regular rate and rhythm, no murmurs rubs or gallops           Complications: none    Electronically signed by Tono Wood MD on 5/17/2023 at 11:29 AM

## 2023-05-18 LAB — ECHO BSA: 2.6 M2

## 2025-04-09 ENCOUNTER — HOSPITAL ENCOUNTER (OUTPATIENT)
Facility: HOSPITAL | Age: 70
Setting detail: OUTPATIENT SURGERY
Discharge: HOME | End: 2025-04-09
Attending: INTERNAL MEDICINE | Admitting: INTERNAL MEDICINE
Payer: MEDICARE

## 2025-04-09 VITALS
SYSTOLIC BLOOD PRESSURE: 123 MMHG | OXYGEN SATURATION: 99 % | HEART RATE: 77 BPM | DIASTOLIC BLOOD PRESSURE: 65 MMHG | WEIGHT: 293 LBS | BODY MASS INDEX: 47.09 KG/M2 | RESPIRATION RATE: 18 BRPM | HEIGHT: 66 IN | TEMPERATURE: 98.1 F

## 2025-04-09 PROCEDURE — 6360000002 HC RX W HCPCS: Performed by: INTERNAL MEDICINE

## 2025-04-09 PROCEDURE — 7100000011 HC PHASE II RECOVERY - ADDTL 15 MIN: Performed by: INTERNAL MEDICINE

## 2025-04-09 PROCEDURE — 88305 TISSUE EXAM BY PATHOLOGIST: CPT

## 2025-04-09 PROCEDURE — 3600007502: Performed by: INTERNAL MEDICINE

## 2025-04-09 PROCEDURE — 3600007512: Performed by: INTERNAL MEDICINE

## 2025-04-09 PROCEDURE — 7100000010 HC PHASE II RECOVERY - FIRST 15 MIN: Performed by: INTERNAL MEDICINE

## 2025-04-09 PROCEDURE — 2709999900 HC NON-CHARGEABLE SUPPLY: Performed by: INTERNAL MEDICINE

## 2025-04-09 PROCEDURE — 2580000003 HC RX 258: Performed by: INTERNAL MEDICINE

## 2025-04-09 RX ORDER — SIMETHICONE 40MG/0.6ML
40 SUSPENSION, DROPS(FINAL DOSAGE FORM)(ML) ORAL EVERY 6 HOURS PRN
Status: DISCONTINUED | OUTPATIENT
Start: 2025-04-09 | End: 2025-04-09 | Stop reason: HOSPADM

## 2025-04-09 RX ORDER — SODIUM CHLORIDE 9 MG/ML
INJECTION, SOLUTION INTRAVENOUS CONTINUOUS
Status: CANCELLED | OUTPATIENT
Start: 2025-04-09

## 2025-04-09 RX ORDER — EPINEPHRINE IN SOD CHLOR,ISO 1 MG/10 ML
1 SYRINGE (ML) INTRAVENOUS ONCE
Status: DISCONTINUED | OUTPATIENT
Start: 2025-04-09 | End: 2025-04-09 | Stop reason: HOSPADM

## 2025-04-09 RX ORDER — NALOXONE HYDROCHLORIDE 0.4 MG/ML
0.4 INJECTION, SOLUTION INTRAMUSCULAR; INTRAVENOUS; SUBCUTANEOUS PRN
Status: DISCONTINUED | OUTPATIENT
Start: 2025-04-09 | End: 2025-04-09 | Stop reason: HOSPADM

## 2025-04-09 RX ORDER — SODIUM CHLORIDE 9 MG/ML
INJECTION, SOLUTION INTRAVENOUS CONTINUOUS
Status: DISCONTINUED | OUTPATIENT
Start: 2025-04-09 | End: 2025-04-09 | Stop reason: HOSPADM

## 2025-04-09 RX ORDER — MIDAZOLAM HYDROCHLORIDE 5 MG/5ML
5 INJECTION, SOLUTION INTRAMUSCULAR; INTRAVENOUS
Status: DISCONTINUED | OUTPATIENT
Start: 2025-04-09 | End: 2025-04-09 | Stop reason: HOSPADM

## 2025-04-09 RX ORDER — DIPHENHYDRAMINE HYDROCHLORIDE 50 MG/ML
25 INJECTION, SOLUTION INTRAMUSCULAR; INTRAVENOUS EVERY 6 HOURS PRN
Status: DISCONTINUED | OUTPATIENT
Start: 2025-04-09 | End: 2025-04-09 | Stop reason: HOSPADM

## 2025-04-09 RX ORDER — FENTANYL CITRATE 50 UG/ML
100 INJECTION, SOLUTION INTRAMUSCULAR; INTRAVENOUS
Status: DISCONTINUED | OUTPATIENT
Start: 2025-04-09 | End: 2025-04-09 | Stop reason: HOSPADM

## 2025-04-09 RX ORDER — ONDANSETRON 2 MG/ML
4 INJECTION INTRAMUSCULAR; INTRAVENOUS EVERY 6 HOURS PRN
Status: DISCONTINUED | OUTPATIENT
Start: 2025-04-09 | End: 2025-04-09 | Stop reason: HOSPADM

## 2025-04-09 RX ORDER — FENTANYL CITRATE 50 UG/ML
INJECTION, SOLUTION INTRAMUSCULAR; INTRAVENOUS PRN
Status: DISCONTINUED | OUTPATIENT
Start: 2025-04-09 | End: 2025-04-09 | Stop reason: ALTCHOICE

## 2025-04-09 RX ORDER — MIDAZOLAM HYDROCHLORIDE 1 MG/ML
INJECTION, SOLUTION INTRAMUSCULAR; INTRAVENOUS PRN
Status: DISCONTINUED | OUTPATIENT
Start: 2025-04-09 | End: 2025-04-09 | Stop reason: ALTCHOICE

## 2025-04-09 RX ORDER — FLUMAZENIL 0.1 MG/ML
0.2 INJECTION INTRAVENOUS ONCE
Status: DISCONTINUED | OUTPATIENT
Start: 2025-04-09 | End: 2025-04-09 | Stop reason: HOSPADM

## 2025-04-09 RX ORDER — GLYCOPYRROLATE 0.2 MG/ML
0.1 INJECTION INTRAMUSCULAR; INTRAVENOUS ONCE
Status: DISCONTINUED | OUTPATIENT
Start: 2025-04-09 | End: 2025-04-09 | Stop reason: HOSPADM

## 2025-04-09 RX ADMIN — SODIUM CHLORIDE: 0.9 INJECTION, SOLUTION INTRAVENOUS at 10:13

## 2025-04-09 RX ADMIN — ONDANSETRON 4 MG: 2 INJECTION INTRAMUSCULAR; INTRAVENOUS at 10:15

## 2025-04-09 ASSESSMENT — PAIN - FUNCTIONAL ASSESSMENT
PAIN_FUNCTIONAL_ASSESSMENT: NONE - DENIES PAIN
PAIN_FUNCTIONAL_ASSESSMENT: 0-10
PAIN_FUNCTIONAL_ASSESSMENT: NONE - DENIES PAIN

## 2025-04-09 NOTE — H&P
Abdominal pain, Change in bowel habits, Change in stool caliber, Constipation, Decreased appetite, Diarrhea, Dysphagia, Heartburn, Hematemesis, Hematochezia, Melena, Nausea, Rectal bleeding, Reflux and Vomiting.    Negative Dysuria and Hematuria.   Endocrine Negative Cold intolerance and Heat intolerance.   Neuro Negative Dizziness, Headache, Numbness and Tremors.   Psych Negative Anxiety, Depression and Increased stress.   Integumentary Negative Hives, Pruritus and Rash.   MS Negative Back pain, Joint pain and Myalgia.   Hema/Lymph Negative Easy bleeding, Easy bruising and Lymphadenopathy.   Allergic/Immuno Negative Food allergies and Immunosuppression.       Vital Signs   Height  Time ft in cm Last Measured Height Position    8:32 AM 5.0 6.50 168.91 10/08/2024 Standing     Weight/BSA/BMI  Time lb oz kg Context BMI kg/m2 BSA m2    8:32 .00  148.325 dressed with shoes 51.99      Date/Time Temp Pulse Resp BP SpO2 O2 Device O2 Flow Rate (L/min) Weight [facility]   04/09/25 1010 97.8 °F (36.6 °C) 87 18 148/80 Abnormal  98 % None (Room air) -- 147.3 kg (324 lb 11.2 oz) Abnormal  HE     Physical  Exam  Exam Findings Details   Constitutional * Overall appearance - morbidly obese.   Eyes Normal Conjunctiva - Right: Normal, Left: Normal. Sclera - Right: Normal, Left: Normal.   Nasopharynx Normal Lips/teeth/gums - Normal.   Neck Exam Normal Inspection - Normal.   Respiratory Normal Inspection - Normal.   Cardiovascular Normal Rhythm - Regular. Extra sounds - None. Murmurs - None.   Vascular Normal Pulses - Brachial: Normal.   Skin Normal Inspection - Normal.   Musculoskeletal Normal Hands/Wrist - Right: Normal, Left: Normal.   Extremity Normal No edema.   Neurological Normal Fine motor skills - Normal.   Psychiatric Normal Orientation - Oriented to time, place, person & situation. Appropriate mood and affect.   Patient is questioned and examined

## 2025-04-09 NOTE — PROCEDURES
Pathology Specimens:  3    Procedure:  The pediatric colonoscope was passed with difficulty through the anus under direct visualization and advanced to the cecum. Retroflexion is made in the ascending colon. The patient required positioning on the back and external counter pressure to aid in the passage of the scope. The scope was withdrawn and the mucosa was carefully examined. The quality of the preparation was good. The views were very good. The patient's toleration of the procedure was very good. Total time is 34 minutes and withdrawal time is 14 minutes.    Findings:    Rectum:   Medium sized internal hemorrhoids.   Sigmoid:   Tortious, loopy and redundant sigmoid colon. Moderated diverticulosis of the sigmoid colon.   Descending Colon:   Moderated diverticulosis of the descending colon. 2 small 5 and 7 flattened sessile polyps in the descending colon, hot snared.   Transverse Colon:   long and redundant transverse colon. 6 mm flat polyp in the proximal transverse colon, hot snared.   Ascending Colon:   8 mm sessile polyp in the distal ascending colon and 6 mm, flat polyp in the hepatic flexure, hot snared. Lipomatosis of the ileocecal valve  Cecum:   Normal  Terminal Ileum:   Not entered.      Unplanned Events: There were no unplanned events.    Estimated Blood Loss: None  IMPLANTS: * No implants in log *  Impressions: Medium sized internal hemorrhoids. Tortious, long and redundant colon. Moderated diverticulosis of the sigmoid and descending colon. 2 small 5 and 7 flattened sessile polyps in the descending colon, hot snared. 6 mm flat polyp in the proximal transverse colon, hot snared. 8 mm sessile polyp in the distal ascending colon and 6 mm, flat polyp in the hepatic flexure, hot snared. Normal Mucosa. No blood or AVM found.    Complications: None; patient tolerated the procedure well.    Recommendations:  Discharge home when standard parameters are met.  Resume a high fiber diet.  Resume own

## 2025-04-09 NOTE — PRE SEDATION
Sedation Pre-Procedure Note    Patient Name: Tata Pardo   YOB: 1955  Room/Bed: ENDO/PL  Medical Record Number: [id number]  Date: 4/9/2025   Time: 10:49 AM       Indication:  of colon polyps    Consent: I have discussed with the patient and/or the patient representative the indication, alternatives, and the possible risks and/or complications of the planned procedure and the anesthesia methods. The patient and/or patient representative appear to understand and agree to proceed.    Vital Signs:   Vitals:    04/09/25 1010   BP: (!) 148/80   Pulse: 87   Resp: 18   Temp: 97.8 °F (36.6 °C)   SpO2: 98%       Past Medical History:   has a past medical history of Arthritis, Cancer (HCC), Chronic kidney disease, Chronic pain, Hypertension, Morbid obesity, PE (pulmonary thromboembolism) (HCC), and Thyroid disease.    Past Surgical History:   has a past surgical history that includes Colonoscopy; Colonoscopy (N/A, 10/9/2019); Cataract removal (Bilateral, 2019); orthopedic surgery; Tubal ligation; Dilation and curettage of uterus; Hysterectomy; Ovary removal (Right); Urological Surgery; Total knee arthroplasty (Right, 2021); Cardiac procedure (N/A, 05/17/2023); and Cholecystectomy, open.    Medications:   Scheduled Meds:    flumazenil  0.2 mg IntraVENous Once    benzocaine   Mouth/Throat 4x Daily    EPINEPHrine  1 mg IntraVENous Once    glycopyrrolate  0.1 mg IntraVENous Once     Continuous Infusions:    sodium chloride 100 mL/hr at 04/09/25 1013    fentaNYL      midazolam       PRN Meds: naloxone, simethicone, diphenhydrAMINE, ondansetron  Home Meds:   Prior to Admission medications    Medication Sig Start Date End Date Taking? Authorizing Provider   Multiple Vitamins-Minerals (PRESERVISION AREDS PO) Take 1 tablet by mouth in the morning and at bedtime   Yes ProviderZechariah MD   Multiple Vitamins-Minerals (ONE-A-DAY WOMENS PO) Take 1 tablet by mouth daily   Yes ProviderZechariah MD   cyclobenzaprine  [FreeTextEntry1] :  #Prolonged PT Will rule out lab error by repeating Differential includes factor deficiency of the extrinsic pathway (Factor VII), liver disease, Vitamin K deficiency, and DIC  >repeat coags, check fibrinogen >mixing study and Factor VII >will also pre-emptively check factor 8, 9, 10

## 2025-04-09 NOTE — DISCHARGE INSTRUCTIONS
The patient and spouse verbalized understanding.  Discharge medications reviewed with the patient and spouse and appropriate educational materials and side effects teaching were provided.      Patient armband removed and shredded    ___________________________________________________________________________________________________________________________________

## (undated) DEVICE — BAND COMPR L24CM REG CLR PLAS HEMSTAT EXT HK AND LOOP RETEN

## (undated) DEVICE — SPONGE GZ W4XL4IN RAYON POLY 4 PLY NONWOVEN FASTER WICKING

## (undated) DEVICE — SINGLE PORT MANIFOLD: Brand: NEPTUNE 2

## (undated) DEVICE — DRAPE,ANGIO,BRACH,STERILE,38X44: Brand: MEDLINE

## (undated) DEVICE — TRAP SPEC COLL POLYP POLYSTYR --

## (undated) DEVICE — KENDALL RADIOLUCENT FOAM MONITORING ELECTRODE RECTANGULAR SHAPE: Brand: KENDALL

## (undated) DEVICE — SYRINGE 50ML E/T

## (undated) DEVICE — CATH SUC CTRL PRT TRIFLO 14FR --

## (undated) DEVICE — MEDI-VAC NON-CONDUCTIVE SUCTION TUBING: Brand: CARDINAL HEALTH

## (undated) DEVICE — Device

## (undated) DEVICE — BLUNTFILL: Brand: MONOJECT

## (undated) DEVICE — GLIDESHEATH SLENDER STAINLESS STEEL KIT: Brand: GLIDESHEATH SLENDER

## (undated) DEVICE — TORCON NB ADVANTAGE CATHETER: Brand: TORCON NB

## (undated) DEVICE — SYR 3ML LL TIP 1/10ML GRAD --

## (undated) DEVICE — SWAN-GANZ POLYMER BLEND TRUE SIZE C-TIP CONTROLCATH TD CATHETER: Brand: SWAN-GANZ CONTROLCATH TRUE SIZE

## (undated) DEVICE — SPONGE GZ W4XL4IN COT 12 PLY TYP VII WVN C FLD DSGN

## (undated) DEVICE — KENDALL SCD EXPRESS SLEEVES, KNEE LENGTH, MEDIUM: Brand: KENDALL SCD

## (undated) DEVICE — GARMENT,MEDLINE,DVT,INT,CALF,MED, GEN2: Brand: MEDLINE

## (undated) DEVICE — TRNQT TEXT 1X18IN BLU LF DISP -- CONVERT TO ITEM 362165

## (undated) DEVICE — CATHETER IV 22GA L1IN BLU POLYUR STR HUB RADPQ PROTCT +

## (undated) DEVICE — MICROSURGICAL INSTRUMENT HYDRODISSECTION CANNULA 27GA, 1.57MM BEND (AKAHOSHI STYLE): Brand: ALCON

## (undated) DEVICE — 3M™ TEGADERM™ I.V. SECUREMENT DRESSING, 9525HP, 2-1/2 IN X 2-3/4 IN (6.5 CM X 7 CM), 100/CT 4CT/CASE: Brand: 3M™ TEGADERM™

## (undated) DEVICE — CATHETER DIAG 5FR L100CM LUMN ID0.047IN JR4 CRV 0 SIDE H

## (undated) DEVICE — Device: Brand: SINGLE USE ELECTROSURGICAL SNARE SD-400

## (undated) DEVICE — STOPCOCK TRNSDUC 500PSI 3 W ROT M LUER LT BLU OFF HNDL R

## (undated) DEVICE — CANNULA CUSH AD W/ 14FT TBG

## (undated) DEVICE — CATHETER PH SUCT 14FR

## (undated) DEVICE — MAJ-1414 SINGLE USE ADPATER BIOPSY VALV: Brand: SINGLE USE ADAPTOR BIOPSY VALVE

## (undated) DEVICE — TRAP SPEC POLYP REM STRNR CLN DSGN MAGNIFYING WIND DISP

## (undated) DEVICE — SENSOR PLSE OXMTR AD CBL L36IN ADH FRM FIT SPO2 DISP

## (undated) DEVICE — TOURNIQUET PHLEB W1XL18IN BLU FLAT RL AND BND REUSE FOR IV

## (undated) DEVICE — SPLINT WR VELC FOAM NEUT POS DISP FOR RAD ART ACC SFT STRP

## (undated) DEVICE — CATH IV SAFE STR 22GX1IN BLU -- PROTECTIV PLUS

## (undated) DEVICE — SOLUTION IV 1000 ML 0.9 NACL INJ USP EXCEL PLAS CONTAINER

## (undated) DEVICE — WRISTBAND ID AD W2.5XL9.5CM RED VYN ADH CLSR UNI-PRINT 655-16-PDJ

## (undated) DEVICE — CANN VISCOFLOW FRM 27G 22MM --

## (undated) DEVICE — NDL PRT INJ NSAF BLNT 18GX1.5 --

## (undated) DEVICE — GLIDESHEATH SLENDER ACCESS KIT: Brand: GLIDESHEATH SLENDER

## (undated) DEVICE — TUBING, SUCTION, 1/4" X 12', STRAIGHT: Brand: MEDLINE

## (undated) DEVICE — MOUTHPIECE ENDOSCP 20X27MM --

## (undated) DEVICE — ERBE NESSY®PLATE 170 SPLIT; 168CM²; CABLE 3M: Brand: ERBE

## (undated) DEVICE — SYRINGE MEDICAL 3ML CLEAR PLASTIC STANDARD NON CONTROL LUERLOCK TIP DISPOSABLE

## (undated) DEVICE — DRAPE EP LT SUBCLAV ENTRY SHLD SORBX

## (undated) DEVICE — SYRINGE MED 5ML STD CLR PLAS LUERLOCK TIP N CTRL DISP

## (undated) DEVICE — GUIDEWIRE VASC L260CM DIA0.035IN TIP L3MM STD EXCHG PTFE J

## (undated) DEVICE — REM POLYHESIVE ADULT PATIENT RETURN ELECTRODE: Brand: VALLEYLAB

## (undated) DEVICE — WRISTBAND ID AD W2.5XL9.5CM RED VYN ADH CLSR UNI-PRINT

## (undated) DEVICE — STRAP,POSITIONING,KNEE/BODY,FOAM,4X60": Brand: MEDLINE

## (undated) DEVICE — RADIFOCUS GLIDEWIRE: Brand: GLIDEWIRE

## (undated) DEVICE — NDL FLTR TIP 5 MIC 18GX1.5IN --

## (undated) DEVICE — SET ADMIN 16ML TBNG L100IN 2 Y INJ SITE IV PIGGY BK DISP

## (undated) DEVICE — PACK PROCEDURE SURG CATH CUST

## (undated) DEVICE — SOLUTION IV 500ML 0.9% SOD CHL FLX CONT

## (undated) DEVICE — SYR 5ML 1/5 GRAD LL NSAF LF --

## (undated) DEVICE — ENDO CARRY-ON PROCEDURE KIT INCLUDES ENZYMATIC SPONGE, GAUZE, BIOHAZARD LABEL, TRAY, LUBRICANT, DIRTY SCOPE LABEL, WATER LABEL, TRAY, DRAWSTRING PAD, AND DEFENDO 4-PIECE KIT.: Brand: ENDO CARRY-ON PROCEDURE KIT